# Patient Record
Sex: FEMALE | Race: WHITE | NOT HISPANIC OR LATINO | Employment: FULL TIME
[De-identification: names, ages, dates, MRNs, and addresses within clinical notes are randomized per-mention and may not be internally consistent; named-entity substitution may affect disease eponyms.]

---

## 2020-11-25 ENCOUNTER — TRANSCRIBE ORDERS (OUTPATIENT)
Dept: OTHER | Age: 65
End: 2020-11-25

## 2020-11-25 ENCOUNTER — PATIENT OUTREACH (OUTPATIENT)
Dept: ONCOLOGY | Facility: CLINIC | Age: 65
End: 2020-11-25

## 2020-11-25 DIAGNOSIS — C54.1 ENDOMETRIAL CANCER (H): Primary | ICD-10-CM

## 2020-11-25 NOTE — PROGRESS NOTES
New Patient Oncology Nurse Navigator Note     Referring provider: Evy Morton MD    Referring Clinic/Organization: [facility]      Referred to: Gyn/Onc    Requested provider (if applicable): First available - did not specify     Referral Received: 11/25/20     Evaluation for : new dx endometrial ca     Clinical History (per Nurse review of records provided):      Patient presented with PMB. Subsequent pelvic US and biopsy performed on 11/13. Pathology showed endometrioid adenocarcinoma.     Clinical Assessment / Barriers to Care (Per Nurse):    Attempted to call patient for outreach, no answer. Did not leave message due to unknown privacy with voicemail.       Records Location: [facility] Everywhere     Records Needed:     None    Additional testing needed prior to consult:     None    Referral updates and Plan:     Referred on for scheduling.      Polina Box, RN, BSN, OCN    [facility]  [phone]

## 2020-11-30 ENCOUNTER — PRE VISIT (OUTPATIENT)
Dept: ONCOLOGY | Facility: CLINIC | Age: 65
End: 2020-11-30

## 2020-11-30 NOTE — TELEPHONE ENCOUNTER
ONCOLOGY INTAKE: Records Information      APPT INFORMATION:  Referring provider: Dr. Morton  Referring provider s clinic:  [facility]   Reason for visit/diagnosis:  Endometrial cancer  Has patient been notified of appointment date and time?: yes    RECORDS INFORMATION:  Were the records received with the referral (via [employer])? no    Has patient been seen for any external appt for this diagnosis? yes    If yes, where? [facility]    Has patient had any imaging or procedures outside of [place]  [place] for this condition? yes      If Yes, where? [facility]     ADDITIONAL INFORMATION:  none

## 2020-12-01 NOTE — TELEPHONE ENCOUNTER
Action    Action Taken 12/1/20:    -Maddy Perry/KAIN Ortega, [facility] - Path: [id number]    -12/11/20: Imaging from [facility] resolved, and now viewable to PACS  7:21 AM       RECORDS STATUS - ALL OTHER DIAGNOSIS      RECORDS RECEIVED FROM: [facility]   DATE RECEIVED:    NOTES STATUS DETAILS   OFFICE NOTE from referring provider SCOTT - [facility] Morton: 11/13/20   OFFICE NOTE from medical oncologist     DISCHARGE SUMMARY from hospital     DISCHARGE REPORT from the ER     OPERATIVE REPORT     MEDICATION LIST     CLINICAL TRIAL TREATMENTS TO DATE     LABS     PATHOLOGY REPORTS [facility], Report in CE, Slides requested 12/1 11/13/20: [id number]   ANYTHING RELATED TO DIAGNOSIS Epic/CE 10/23/20: PAP   GENONOMIC TESTING     TYPE:     IMAGING (NEED IMAGES & REPORT)     CT SCANS     MRI     MAMMO     ULTRASOUND Requested 12/1 11/13/20: [facility], Report in CE   PET

## 2020-12-13 ASSESSMENT — ENCOUNTER SYMPTOMS
HOT FLASHES: 0
DECREASED LIBIDO: 0

## 2020-12-13 NOTE — PROGRESS NOTES
Consult Notes on Referred Patient    Date: 2020       Dr. Evy Morton MD  [facility]  [address] [place] [address]       RE: Dee Carlos  : 1955  IAN: 2020    Dear Dr. Evy Morton:    I had the pleasure of seeing your patient Dee Carlos here at the Gynecologic Cancer Clinic at the [facility] on 2020.  As you know she is a very pleasant 65 year old woman with a recent diagnosis of endometrial cancer.  Given these findings she was subsequently sent to the Gynecologic Cancer Clinic for new patient consultation.     HPI  She was recently evaluated by Dr. Morton for PMB.  This evaluation resulted in an US which demonstrated fibroids and a thickened EMS.  A pap and biopsy were obtained (HPV negative, JULIET cells).  Biopsy demonstarted grade 1 EMCA      Review of Systems:    Systemic           no weight changes; no fever; no chills; no night sweats; no appetite changes  Skin           no rashes, or lesions  Eye           no irritation; no changes in vision  Heriberto-Laryngeal           no dysphagia; no hoarseness   Pulmonary    no cough; no shortness of breath  Cardiovascular    no chest pain; no palpitations  Gastrointestinal    no diarrhea; no constipation; no abdominal pain; no changes in bowel  habits; no blood in stool  Genitourinary   no urinary frequency; no urinary urgency; no dysuria; no pain; no abnormal vaginal discharge; no abnormal vaginal bleeding  Breast   no breast discharge; no breast changes; no breast pain  Musculoskeletal    no myalgias; no arthralgias; no back pain  Psychiatric           no depressed mood; no anxiety    Hematologic           no tender lymph nodes; no noticeable swellings or lumps   Endocrine    no hot flashes; no heat/cold intolerance         Neurological   no tremor; no numbness and tingling; no headaches; no difficulty  sleeping      Past Medical History:    No past medical history on  "file.      Past Surgical History:    No past surgical history on file.      Health Maintenance:  Health Maintenance Due   Topic Date Due     PREVENTIVE CARE VISIT  1955     ADVANCE CARE PLANNING  1955     MAMMO SCREENING  1955     COLORECTAL CANCER SCREENING  06/06/1967     HIV SCREENING  06/06/1972     HEPATITIS C SCREENING  06/06/1975     DTAP/TDAP/TD IMMUNIZATION (1 - Tdap) 06/06/1982     LIPID  06/06/2002     ZOSTER IMMUNIZATION (2 of 3) 12/22/2017     PHQ-2  01/01/2020       Last Pap Smear: See above    Last Mammogram: birads 2; 2020     Last Colonoscopy: none      Current Medications:     currently has no medications in their medication list.       Allergies:      No Known Allergies       Social History:     Social History     Tobacco Use     Smoking status: Not on file   Substance Use Topics     Alcohol use: Not on file       History   Drug Use Not on file           Family History:     The patient's family history is notable for no pberast, colon, ovary, uterine CA.      Physical Exam:   PS 0  VS: BP (!) 143/73   Pulse 72   Temp 97.5  F (36.4  C) (Tympanic)   Resp 18   Ht 1.651 m (5' 5\")   Wt 78 kg (171 lb 14.4 oz)   SpO2 98%   BMI 28.61 kg/m       General Appearance: healthy and alert, no distress     HEENT:  no thyromegaly, no palpable nodules or masses        Cardiovascular: regular rate and rhythm, no gallops, rubs or murmurs     Respiratory: lungs clear, no rales, rhonchi or wheezes, normal diaphragmatic excursion    Musculoskeletal: extremities non tender and without edema    Skin: no lesions or rashes     Neurological: normal gait, no gross defects     Psychiatric: appropriate mood and affect                               Hematological: normal cervical, supraclavicular and inguinal lymph nodes     Gastrointestinal:       abdomen soft, non-tender, non-distended, no organomegaly or masses    Genitourinary: External genitalia and urethral meatus appears normal.  Vagina is smooth " without nodularity or masses.  Cervix appears normal and without lesions.  Bimanual exam reveals no masses, nodularity or fullness.  Recto-vaginal exam confirms these findings.      Assessment:    Dee Carlos is a 65 year old woman with a new diagnosis of EMCA.     A total of 60 minutes was spent with the patient, 40 minutes of which were spent in counseling the patient and/or treatment planning.      Plan:     1.)  EMCA  -  we have discussed the pathologic an clinical findings and she is aware of the potential for occult malignancy that is worse than her current diagnosis.  We have discussed options including medical and surgical management. After a comprehensive discussion of the relative risks and benefits of each strategy she is inclined to surgery, which is reasonable.  I have discussed the potential risks and benefits of ovarian removal at the time of hysterectomy and she is inclined to TLH/BSO, with staging as indicated by the intra-operative findings.     We have discussed enrollemnt in Dr. Ferreira's exemestane trial; unfortunately - while she is interested there is no research team here today.     2.) Genetic risk factors were assessed and the patient does not meet the qualifications for a referral.      3.) Labs and/or tests ordered include:  Pre-operative labs.     4.) Health maintenance issues addressed today include none.    5.) Pre-op teaching was completed today.  Risks of surgery were discussed to include: bleeding, transfusion, infection, unintentional injury to surrounding organs/structures.      Thank you for allowing us to participate in the care of your patient.         Sincerely,    John Proctor MD      CC  No care team member to display  BRANDY LUCAS    Answers for HPI/ROS submitted by the patient on 12/13/2020   General Symptoms: No  Skin Symptoms: No  HENT Symptoms: No  EYE SYMPTOMS: No  HEART SYMPTOMS: No  LUNG SYMPTOMS: No  INTESTINAL SYMPTOMS: No  URINARY SYMPTOMS:  No  GYNECOLOGIC SYMPTOMS: Yes  BREAST SYMPTOMS: No  SKELETAL SYMPTOMS: No  BLOOD SYMPTOMS: No  NERVOUS SYSTEM SYMPTOMS: No  MENTAL HEALTH SYMPTOMS: No  Bleeding or spotting between periods: No  Heavy or painful periods: No  Irregular periods: No  Vaginal discharge: No  Hot flashes: No  Vaginal dryness: No  Genital ulcers: No  Reduced libido: No  Painful intercourse: No  Difficulty with sexual arousal: No  Post-menopausal bleeding: Yes

## 2020-12-14 ENCOUNTER — ANCILLARY PROCEDURE (OUTPATIENT)
Dept: GENERAL RADIOLOGY | Facility: CLINIC | Age: 65
End: 2020-12-14
Attending: OBSTETRICS & GYNECOLOGY
Payer: COMMERCIAL

## 2020-12-14 ENCOUNTER — ONCOLOGY VISIT (OUTPATIENT)
Dept: ONCOLOGY | Facility: CLINIC | Age: 65
End: 2020-12-14
Attending: OBSTETRICS & GYNECOLOGY
Payer: COMMERCIAL

## 2020-12-14 VITALS
DIASTOLIC BLOOD PRESSURE: 73 MMHG | SYSTOLIC BLOOD PRESSURE: 143 MMHG | OXYGEN SATURATION: 98 % | HEART RATE: 72 BPM | HEIGHT: 65 IN | TEMPERATURE: 97.5 F | WEIGHT: 171.9 LBS | BODY MASS INDEX: 28.64 KG/M2 | RESPIRATION RATE: 18 BRPM

## 2020-12-14 DIAGNOSIS — C54.1 ENDOMETRIAL CANCER (H): ICD-10-CM

## 2020-12-14 LAB
ALBUMIN SERPL-MCNC: 4.2 G/DL (ref 3.4–5)
ALP SERPL-CCNC: 53 U/L (ref 40–150)
ALT SERPL W P-5'-P-CCNC: 37 U/L (ref 0–50)
ANION GAP SERPL CALCULATED.3IONS-SCNC: 8 MMOL/L (ref 3–14)
AST SERPL W P-5'-P-CCNC: 35 U/L (ref 0–45)
BASOPHILS # BLD AUTO: 0 10E9/L (ref 0–0.2)
BASOPHILS NFR BLD AUTO: 0.7 %
BILIRUB SERPL-MCNC: 0.5 MG/DL (ref 0.2–1.3)
BUN SERPL-MCNC: 20 MG/DL (ref 7–30)
CALCIUM SERPL-MCNC: 9.7 MG/DL (ref 8.5–10.1)
CHLORIDE SERPL-SCNC: 96 MMOL/L (ref 94–109)
CO2 SERPL-SCNC: 31 MMOL/L (ref 20–32)
CREAT SERPL-MCNC: 0.89 MG/DL (ref 0.52–1.04)
DIFFERENTIAL METHOD BLD: NORMAL
EOSINOPHIL # BLD AUTO: 0.3 10E9/L (ref 0–0.7)
EOSINOPHIL NFR BLD AUTO: 4.3 %
ERYTHROCYTE [DISTWIDTH] IN BLOOD BY AUTOMATED COUNT: 12.3 % (ref 10–15)
GFR SERPL CREATININE-BSD FRML MDRD: 69 ML/MIN/{1.73_M2}
GLUCOSE SERPL-MCNC: 108 MG/DL (ref 70–99)
HCT VFR BLD AUTO: 42.1 % (ref 35–47)
HGB BLD-MCNC: 13.9 G/DL (ref 11.7–15.7)
IMM GRANULOCYTES # BLD: 0 10E9/L (ref 0–0.4)
IMM GRANULOCYTES NFR BLD: 0.2 %
LYMPHOCYTES # BLD AUTO: 1.9 10E9/L (ref 0.8–5.3)
LYMPHOCYTES NFR BLD AUTO: 32.4 %
MCH RBC QN AUTO: 32.1 PG (ref 26.5–33)
MCHC RBC AUTO-ENTMCNC: 33 G/DL (ref 31.5–36.5)
MCV RBC AUTO: 97 FL (ref 78–100)
MONOCYTES # BLD AUTO: 0.6 10E9/L (ref 0–1.3)
MONOCYTES NFR BLD AUTO: 10.4 %
NEUTROPHILS # BLD AUTO: 3.1 10E9/L (ref 1.6–8.3)
NEUTROPHILS NFR BLD AUTO: 52 %
NRBC # BLD AUTO: 0 10*3/UL
NRBC BLD AUTO-RTO: 0 /100
PLATELET # BLD AUTO: 258 10E9/L (ref 150–450)
POTASSIUM SERPL-SCNC: 3.8 MMOL/L (ref 3.4–5.3)
PROT SERPL-MCNC: 8.5 G/DL (ref 6.8–8.8)
RBC # BLD AUTO: 4.33 10E12/L (ref 3.8–5.2)
SODIUM SERPL-SCNC: 134 MMOL/L (ref 133–144)
WBC # BLD AUTO: 5.9 10E9/L (ref 4–11)

## 2020-12-14 PROCEDURE — 80053 COMPREHEN METABOLIC PANEL: CPT | Performed by: PATHOLOGY

## 2020-12-14 PROCEDURE — 93010 ELECTROCARDIOGRAM REPORT: CPT | Performed by: INTERNAL MEDICINE

## 2020-12-14 PROCEDURE — 36415 COLL VENOUS BLD VENIPUNCTURE: CPT | Performed by: PATHOLOGY

## 2020-12-14 PROCEDURE — 99205 OFFICE O/P NEW HI 60 MIN: CPT | Performed by: OBSTETRICS & GYNECOLOGY

## 2020-12-14 PROCEDURE — 85025 COMPLETE CBC W/AUTO DIFF WBC: CPT | Performed by: PATHOLOGY

## 2020-12-14 PROCEDURE — 93005 ELECTROCARDIOGRAM TRACING: CPT

## 2020-12-14 PROCEDURE — G0463 HOSPITAL OUTPT CLINIC VISIT: HCPCS | Mod: 25

## 2020-12-14 PROCEDURE — 71046 X-RAY EXAM CHEST 2 VIEWS: CPT | Mod: GC | Performed by: RADIOLOGY

## 2020-12-14 RX ORDER — ATORVASTATIN CALCIUM 40 MG/1
40 TABLET, FILM COATED ORAL DAILY
COMMUNITY
Start: 2020-11-18 | End: 2021-11-23

## 2020-12-14 RX ORDER — CEFAZOLIN SODIUM 2 G/50ML
2 SOLUTION INTRAVENOUS
Status: CANCELLED | OUTPATIENT
Start: 2020-12-14

## 2020-12-14 RX ORDER — AMOXICILLIN 500 MG/1
500 CAPSULE ORAL DAILY
COMMUNITY
Start: 2019-12-16 | End: 2021-01-08

## 2020-12-14 RX ORDER — CEFAZOLIN SODIUM 1 G/50ML
1 INJECTION, SOLUTION INTRAVENOUS SEE ADMIN INSTRUCTIONS
Status: CANCELLED | OUTPATIENT
Start: 2020-12-14

## 2020-12-14 RX ORDER — AMLODIPINE BESYLATE 10 MG/1
10 TABLET ORAL DAILY
COMMUNITY
Start: 2020-11-18

## 2020-12-14 RX ORDER — PHENAZOPYRIDINE HYDROCHLORIDE 200 MG/1
200 TABLET, FILM COATED ORAL ONCE
Status: CANCELLED | OUTPATIENT
Start: 2020-12-14 | End: 2020-12-14

## 2020-12-14 RX ORDER — LISINOPRIL AND HYDROCHLOROTHIAZIDE 20; 25 MG/1; MG/1
1 TABLET ORAL DAILY
COMMUNITY
Start: 2020-11-18

## 2020-12-14 SDOH — HEALTH STABILITY: MENTAL HEALTH: HOW MANY STANDARD DRINKS CONTAINING ALCOHOL DO YOU HAVE ON A TYPICAL DAY?: 1 OR 2

## 2020-12-14 SDOH — HEALTH STABILITY: MENTAL HEALTH: HOW OFTEN DO YOU HAVE 6 OR MORE DRINKS ON ONE OCCASION?: NOT ASKED

## 2020-12-14 SDOH — HEALTH STABILITY: MENTAL HEALTH: HOW OFTEN DO YOU HAVE A DRINK CONTAINING ALCOHOL?: 4 OR MORE TIMES A WEEK

## 2020-12-14 ASSESSMENT — MIFFLIN-ST. JEOR: SCORE: 1335.61

## 2020-12-14 ASSESSMENT — PAIN SCALES - GENERAL: PAINLEVEL: NO PAIN (0)

## 2020-12-14 NOTE — NURSING NOTE
"Oncology Rooming Note    December 14, 2020 12:11 PM   Dee Carlos is a 65 year old female who presents for:    Chief Complaint   Patient presents with     Consult     Endometrial Cancer     Initial Vitals: BP (!) 143/73   Pulse 72   Temp 97.5  F (36.4  C) (Tympanic)   Resp 18   Ht 1.651 m (5' 5\")   Wt 78 kg (171 lb 14.4 oz)   SpO2 98%   BMI 28.61 kg/m   Estimated body mass index is 28.61 kg/m  as calculated from the following:    Height as of this encounter: 1.651 m (5' 5\").    Weight as of this encounter: 78 kg (171 lb 14.4 oz). Body surface area is 1.89 meters squared.  No Pain (0) Comment: Data Unavailable   No LMP recorded.  Allergies reviewed: Yes  Medications reviewed: Yes    Medications: Medication refills not needed today.  Pharmacy name entered into [employer]: [facility], [place] - [id number]    Clinical concerns: an Ma CMA              "

## 2020-12-14 NOTE — PATIENT INSTRUCTIONS
Surgery to be scheduled  Post operative visit will be set up about 2 weeks after your surgery, you will be contacted with at date and time

## 2020-12-14 NOTE — LETTER
2020         RE: Dee Carlos  [address]  [place] [address]        Dear Colleague,    Thank you for referring your patient, Dee Carlos, to the [facility]. Please see a copy of my visit note below.                            Consult Notes on Referred Patient    Date: 2020       Dr. Evy Morton MD  [facility]  [address] [place] [address]       RE: Dee Carlos  : 1955  IAN: 2020    Dear Dr. Evy Morton:    I had the pleasure of seeing your patient Dee Carlos here at the Gynecologic Cancer Clinic at the [facility] on 2020.  As you know she is a very pleasant 65 year old woman with a recent diagnosis of endometrial cancer.  Given these findings she was subsequently sent to the Gynecologic Cancer Clinic for new patient consultation.     HPI  She was recently evaluated by Dr. Morton for PMB.  This evaluation resulted in an US which demonstrated fibroids and a thickened EMS.  A pap and biopsy were obtained (HPV negative, JULIET cells).  Biopsy demonstarted grade 1 EMCA      Review of Systems:    Systemic           no weight changes; no fever; no chills; no night sweats; no appetite changes  Skin           no rashes, or lesions  Eye           no irritation; no changes in vision  Heriberto-Laryngeal           no dysphagia; no hoarseness   Pulmonary    no cough; no shortness of breath  Cardiovascular    no chest pain; no palpitations  Gastrointestinal    no diarrhea; no constipation; no abdominal pain; no changes in bowel  habits; no blood in stool  Genitourinary   no urinary frequency; no urinary urgency; no dysuria; no pain; no abnormal vaginal discharge; no abnormal vaginal bleeding  Breast   no breast discharge; no breast changes; no breast pain  Musculoskeletal    no myalgias; no arthralgias; no back pain  Psychiatric           no depressed mood; no anxiety    Hematologic           no tender  "lymph nodes; no noticeable swellings or lumps   Endocrine    no hot flashes; no heat/cold intolerance         Neurological   no tremor; no numbness and tingling; no headaches; no difficulty  sleeping      Past Medical History:    No past medical history on file.      Past Surgical History:    No past surgical history on file.      Health Maintenance:  Health Maintenance Due   Topic Date Due     PREVENTIVE CARE VISIT  1955     ADVANCE CARE PLANNING  1955     MAMMO SCREENING  1955     COLORECTAL CANCER SCREENING  06/06/1967     HIV SCREENING  06/06/1972     HEPATITIS C SCREENING  06/06/1975     DTAP/TDAP/TD IMMUNIZATION (1 - Tdap) 06/06/1982     LIPID  06/06/2002     ZOSTER IMMUNIZATION (2 of 3) 12/22/2017     PHQ-2  01/01/2020       Last Pap Smear: See above    Last Mammogram: birads 2; 2020     Last Colonoscopy: none      Current Medications:     currently has no medications in their medication list.       Allergies:      No Known Allergies       Social History:     Social History     Tobacco Use     Smoking status: Not on file   Substance Use Topics     Alcohol use: Not on file       History   Drug Use Not on file           Family History:     The patient's family history is notable for no pberast, colon, ovary, uterine CA.      Physical Exam:   PS 0  VS: BP (!) 143/73   Pulse 72   Temp 97.5  F (36.4  C) (Tympanic)   Resp 18   Ht 1.651 m (5' 5\")   Wt 78 kg (171 lb 14.4 oz)   SpO2 98%   BMI 28.61 kg/m       General Appearance: healthy and alert, no distress     HEENT:  no thyromegaly, no palpable nodules or masses        Cardiovascular: regular rate and rhythm, no gallops, rubs or murmurs     Respiratory: lungs clear, no rales, rhonchi or wheezes, normal diaphragmatic excursion    Musculoskeletal: extremities non tender and without edema    Skin: no lesions or rashes     Neurological: normal gait, no gross defects     Psychiatric: appropriate mood and affect                             "   Hematological: normal cervical, supraclavicular and inguinal lymph nodes     Gastrointestinal:       abdomen soft, non-tender, non-distended, no organomegaly or masses    Genitourinary: External genitalia and urethral meatus appears normal.  Vagina is smooth without nodularity or masses.  Cervix appears normal and without lesions.  Bimanual exam reveals no masses, nodularity or fullness.  Recto-vaginal exam confirms these findings.      Assessment:    Dee Carlos is a 65 year old woman with a new diagnosis of EMCA.     A total of 60 minutes was spent with the patient, 40 minutes of which were spent in counseling the patient and/or treatment planning.      Plan:     1.)  EMCA  -  we have discussed the pathologic an clinical findings and she is aware of the potential for occult malignancy that is worse than her current diagnosis.  We have discussed options including medical and surgical management. After a comprehensive discussion of the relative risks and benefits of each strategy she is inclined to surgery, which is reasonable.  I have discussed the potential risks and benefits of ovarian removal at the time of hysterectomy and she is inclined to TLH/BSO, with staging as indicated by the intra-operative findings.     We have discussed enrollemnt in Dr. Ferreira's exemestane trial; unfortunately - while she is interested there is no research team here today.     2.) Genetic risk factors were assessed and the patient does not meet the qualifications for a referral.      3.) Labs and/or tests ordered include:  Pre-operative labs.     4.) Health maintenance issues addressed today include none.    5.) Pre-op teaching was completed today.  Risks of surgery were discussed to include: bleeding, transfusion, infection, unintentional injury to surrounding organs/structures.      Thank you for allowing us to participate in the care of your patient.         Sincerely,    John Proctor MD      CC  No care team  member to display  BRANDY LUCAS    Answers for HPI/ROS submitted by the patient on 12/13/2020   General Symptoms: No  Skin Symptoms: No  HENT Symptoms: No  EYE SYMPTOMS: No  HEART SYMPTOMS: No  LUNG SYMPTOMS: No  INTESTINAL SYMPTOMS: No  URINARY SYMPTOMS: No  GYNECOLOGIC SYMPTOMS: Yes  BREAST SYMPTOMS: No  SKELETAL SYMPTOMS: No  BLOOD SYMPTOMS: No  NERVOUS SYSTEM SYMPTOMS: No  MENTAL HEALTH SYMPTOMS: No  Bleeding or spotting between periods: No  Heavy or painful periods: No  Irregular periods: No  Vaginal discharge: No  Hot flashes: No  Vaginal dryness: No  Genital ulcers: No  Reduced libido: No  Painful intercourse: No  Difficulty with sexual arousal: No  Post-menopausal bleeding: Yes        Again, thank you for allowing me to participate in the care of your patient.        Sincerely,        John Proctor MD

## 2020-12-14 NOTE — NURSING NOTE
Pre Op Nurse Teaching Template    Relevant Diagnosis: endometrial cancer    Teaching Topic: laparoscopic removal of uterus bilateral fallopian tubes and ovaries, cervix, possible open abdomen cancer operation     Person(s) involved in teaching :  Patient    Motivation Level:  Asks Questions:    Yes      Eager to Learn:     Yes     Cooperative:          Yes    Receptive (willing. Able to accept information):    Yes      Patient and those who are listed above demonstrates understanding of the following:   Reason for the appointment, diagnosis and treatment plan:   Yes   Knowledge of proper use of medications and conditions for which they are ordered (with special attention to potential side effects or drug interactions): Yes   Which situations necessitate calling provider and whom to contact: Yes         Nutritional needs and diet plan:  Yes      Pain management techniques:     Yes, Pain Scale   Diet:   Yes, [facility] Diet Instructions    Teaching Concerns addressed: Yes    Infection Prevention:  Patient and those who are listed above demonstrate understanding of the following:  Pre-Op CHG Bathing Instructions: Yes  Surgical procedure site care taught:   Yes   Signs and symptoms of infection taught: Yes       Instructional Materials Used/Given:  The [place] Before You Surgery Booklet  Showering or Bathing before Surgery Instructions & CHG Product  Hysterectomy Guidelines  Pain Assessment Tool   Home Care after Major Abdominal or Vaginal Surgery  Map  Accommodations Brochure  Phone numbers for [facility] and [facility]  Copy of Surgical Consent    Done Today:  Lab work, EKG, Chest Xray,   Preop Visit not needed   Tests Ordered:  Post Op Visit Scheduled:tbd  Comments:    Surgery date/time:tbd  covid test to be done 3-4 days before surgery date    Consent to file in medical records  .

## 2020-12-14 NOTE — NURSING NOTE
EKG was performed on patient. Patient tolerated EKG well without any incidents. EKG was transmitted to [facility] and given to Susi.       Edna Coker MA on 12/14/2020 at 2:29 PM

## 2020-12-15 ENCOUNTER — DOCUMENTATION ONLY (OUTPATIENT)
Dept: ONCOLOGY | Facility: CLINIC | Age: 65
End: 2020-12-15

## 2020-12-15 LAB — INTERPRETATION ECG - MUSE: NORMAL

## 2020-12-15 NOTE — PROGRESS NOTES
Surgery is scheduled with Dr. Proctor on 1/12 at [place] [facility].  Scheduled per RN.    H&P: to be completed by PCP or Dr. Proctor  COVID: 1/8, 1 PM at [facility]   Post-op: will be scheduled by the clinic.     The RN completed the education regarding the surgery.     Patient will receive a phone call from pre-admission nurses 1-2 days prior to surgery with arrival and start time.    The surgery packet was provided by the RN during appointment.    Per communication - I did not need to reach out to the patient regarding the above information. If this changes, I will reach out.

## 2020-12-16 PROCEDURE — 88360 TUMOR IMMUNOHISTOCHEM/MANUAL: CPT | Mod: 26 | Performed by: PATHOLOGY

## 2020-12-16 PROCEDURE — 88323 CONSLTJ&REPRT MATRL PREP SLD: CPT | Mod: 26 | Performed by: PATHOLOGY

## 2020-12-16 PROCEDURE — 88342 IMHCHEM/IMCYTCHM 1ST ANTB: CPT | Mod: 26 | Performed by: PATHOLOGY

## 2020-12-16 PROCEDURE — 88360 TUMOR IMMUNOHISTOCHEM/MANUAL: CPT | Mod: TC | Performed by: OBSTETRICS & GYNECOLOGY

## 2020-12-16 PROCEDURE — 88342 IMHCHEM/IMCYTCHM 1ST ANTB: CPT | Mod: TC | Performed by: OBSTETRICS & GYNECOLOGY

## 2020-12-16 PROCEDURE — 88323 CONSLTJ&REPRT MATRL PREP SLD: CPT | Mod: TC | Performed by: OBSTETRICS & GYNECOLOGY

## 2020-12-28 LAB — COPATH REPORT: NORMAL

## 2021-01-09 ENCOUNTER — HEALTH MAINTENANCE LETTER (OUTPATIENT)
Age: 66
End: 2021-01-09

## 2021-01-09 DIAGNOSIS — Z20.822 COVID-19 RULED OUT: Primary | ICD-10-CM

## 2021-01-09 LAB
SARS-COV-2 RNA RESP QL NAA+PROBE: NORMAL
SPECIMEN SOURCE: NORMAL

## 2021-01-09 PROCEDURE — U0003 INFECTIOUS AGENT DETECTION BY NUCLEIC ACID (DNA OR RNA); SEVERE ACUTE RESPIRATORY SYNDROME CORONAVIRUS 2 (SARS-COV-2) (CORONAVIRUS DISEASE [COVID-19]), AMPLIFIED PROBE TECHNIQUE, MAKING USE OF HIGH THROUGHPUT TECHNOLOGIES AS DESCRIBED BY CMS-2020-01-R: HCPCS | Performed by: OBSTETRICS & GYNECOLOGY

## 2021-01-09 PROCEDURE — U0005 INFEC AGEN DETEC AMPLI PROBE: HCPCS | Performed by: OBSTETRICS & GYNECOLOGY

## 2021-01-10 LAB
LABORATORY COMMENT REPORT: NORMAL
SARS-COV-2 RNA RESP QL NAA+PROBE: NEGATIVE
SPECIMEN SOURCE: NORMAL

## 2021-01-11 ENCOUNTER — ANESTHESIA EVENT (OUTPATIENT)
Dept: SURGERY | Facility: CLINIC | Age: 66
End: 2021-01-11
Payer: COMMERCIAL

## 2021-01-11 NOTE — ANESTHESIA PREPROCEDURE EVALUATION
"Anesthesia Pre-Procedure Evaluation    Patient: Dee Carlos   MRN:     [id number] Gender:   female   Age:    65 year old :      1955        Preoperative Diagnosis: Endometrial cancer (H) [C54.1]   Procedure(s):  HYSTERECTOMY, TOTAL, LAPAROSCOPIC, WITH SALPINGO-OOPHORECTOMY AND LYMPHADENECTOMY  HYSTERECTOMY, TOTAL ABDOMINAL, WITH BILATERAL SALPINGO-OOPHORECTOMY, WITH LYMPHADENECTOMY  CYSTOSCOPY  Latex Free     LABS:  CBC:   Lab Results   Component Value Date    WBC 5.9 2020    HGB 13.9 2020    HCT 42.1 2020     2020     BMP:   Lab Results   Component Value Date     2020    POTASSIUM 3.8 2020    CHLORIDE 96 2020    CO2 31 2020    BUN 20 2020    CR 0.89 2020     (H) 2020     COAGS: No results found for: PTT, INR, FIBR  POC: No results found for: BGM, HCG, HCGS  OTHER:   Lab Results   Component Value Date    LEI 9.7 2020    ALBUMIN 4.2 2020    PROTTOTAL 8.5 2020    ALT 37 2020    AST 35 2020    ALKPHOS 53 2020    BILITOTAL 0.5 2020        Preop Vitals    BP Readings from Last 3 Encounters:   20 (!) 143/73    Pulse Readings from Last 3 Encounters:   20 72      Resp Readings from Last 3 Encounters:   20 18    SpO2 Readings from Last 3 Encounters:   20 98%      Temp Readings from Last 1 Encounters:   20 36.4  C (97.5  F) (Tympanic)    Ht Readings from Last 1 Encounters:   20 1.651 m (5' 5\")      Wt Readings from Last 1 Encounters:   20 78 kg (171 lb 14.4 oz)    Estimated body mass index is 28.61 kg/m  as calculated from the following:    Height as of 20: 1.651 m (5' 5\").    Weight as of 20: 78 kg (171 lb 14.4 oz).     LDA:        History reviewed. No pertinent past medical history.   History reviewed. No pertinent surgical history.   No Known Allergies     Anesthesia Evaluation     .             ROS/MED HX    ENT/Pulmonary:  - neg " pulmonary ROS     Neurologic:  - neg neurologic ROS     Cardiovascular:     (+) Dyslipidemia, hypertension----. : . . . :. .       METS/Exercise Tolerance:     Hematologic:         Musculoskeletal:   (+) arthritis,  -       GI/Hepatic:         Renal/Genitourinary:         Endo:         Psychiatric:         Infectious Disease:         Malignancy:   (+) Malignancy   Endometrial cancer        Other:                     JZG FV AN PHYSICAL EXAM    Assessment:   ASA SCORE: 2    H&P: History and physical reviewed and following examination; no interval change.   Smoking Status:  Non-Smoker/Unknown        Plan:   Anes. Type:  General   Pre-Medication: None   Induction:  IV (Standard)   Airway: ETT; Oral   Access/Monitoring: PIV; 2nd PIV   Maintenance: Balanced     Postop Plan:   Postop Pain: Opioids; Regional  Postop Sedation/Airway: Not planned     PONV Management:   Adult Risk Factors: Female, Non-Smoker, Postop Opioids   Prevention: Ondansetron, Dexamethasone     CONSENT: Direct conversation   Plan and risks discussed with: Patient   Blood Products: Consented (ALL Blood Products)                   Dario Henley MD

## 2021-01-12 ENCOUNTER — HOSPITAL ENCOUNTER (OUTPATIENT)
Facility: CLINIC | Age: 66
Discharge: HOME | End: 2021-01-12
Attending: OBSTETRICS & GYNECOLOGY | Admitting: OBSTETRICS & GYNECOLOGY
Payer: COMMERCIAL

## 2021-01-12 ENCOUNTER — ANESTHESIA (OUTPATIENT)
Dept: SURGERY | Facility: CLINIC | Age: 66
End: 2021-01-12
Payer: COMMERCIAL

## 2021-01-12 VITALS
DIASTOLIC BLOOD PRESSURE: 60 MMHG | HEIGHT: 63 IN | WEIGHT: 168.87 LBS | HEART RATE: 57 BPM | RESPIRATION RATE: 20 BRPM | OXYGEN SATURATION: 94 % | TEMPERATURE: 96.9 F | BODY MASS INDEX: 29.92 KG/M2 | SYSTOLIC BLOOD PRESSURE: 109 MMHG

## 2021-01-12 DIAGNOSIS — C54.1 ENDOMETRIAL CANCER (H): ICD-10-CM

## 2021-01-12 LAB
ABO + RH BLD: NORMAL
ABO + RH BLD: NORMAL
BLD GP AB SCN SERPL QL: NORMAL
BLOOD BANK CMNT PATIENT-IMP: NORMAL
GLUCOSE BLDC GLUCOMTR-MCNC: 100 MG/DL (ref 70–99)
SPECIMEN EXP DATE BLD: NORMAL

## 2021-01-12 PROCEDURE — 250N000025 HC SEVOFLURANE, PER MIN: Performed by: OBSTETRICS & GYNECOLOGY

## 2021-01-12 PROCEDURE — 86901 BLOOD TYPING SEROLOGIC RH(D): CPT | Performed by: ANESTHESIOLOGY

## 2021-01-12 PROCEDURE — 88112 CYTOPATH CELL ENHANCE TECH: CPT | Mod: 26 | Performed by: PATHOLOGY

## 2021-01-12 PROCEDURE — 250N000011 HC RX IP 250 OP 636: Performed by: STUDENT IN AN ORGANIZED HEALTH CARE EDUCATION/TRAINING PROGRAM

## 2021-01-12 PROCEDURE — 272N000001 HC OR GENERAL SUPPLY STERILE: Performed by: OBSTETRICS & GYNECOLOGY

## 2021-01-12 PROCEDURE — 999N001020 HC STATISTIC H-SEND OUTS PREP: Performed by: OBSTETRICS & GYNECOLOGY

## 2021-01-12 PROCEDURE — 88305 TISSUE EXAM BY PATHOLOGIST: CPT | Mod: TC | Performed by: OBSTETRICS & GYNECOLOGY

## 2021-01-12 PROCEDURE — 250N000013 HC RX MED GY IP 250 OP 250 PS 637: Performed by: STUDENT IN AN ORGANIZED HEALTH CARE EDUCATION/TRAINING PROGRAM

## 2021-01-12 PROCEDURE — 88112 CYTOPATH CELL ENHANCE TECH: CPT | Mod: TC | Performed by: OBSTETRICS & GYNECOLOGY

## 2021-01-12 PROCEDURE — 86900 BLOOD TYPING SEROLOGIC ABO: CPT | Performed by: ANESTHESIOLOGY

## 2021-01-12 PROCEDURE — 370N000017 HC ANESTHESIA TECHNICAL FEE, PER MIN: Performed by: OBSTETRICS & GYNECOLOGY

## 2021-01-12 PROCEDURE — 88342 IMHCHEM/IMCYTCHM 1ST ANTB: CPT | Mod: 26 | Performed by: PATHOLOGY

## 2021-01-12 PROCEDURE — 258N000003 HC RX IP 258 OP 636: Performed by: STUDENT IN AN ORGANIZED HEALTH CARE EDUCATION/TRAINING PROGRAM

## 2021-01-12 PROCEDURE — 88307 TISSUE EXAM BY PATHOLOGIST: CPT | Mod: TC | Performed by: OBSTETRICS & GYNECOLOGY

## 2021-01-12 PROCEDURE — 710N000010 HC RECOVERY PHASE 1, LEVEL 2, PER MIN: Performed by: OBSTETRICS & GYNECOLOGY

## 2021-01-12 PROCEDURE — 88360 TUMOR IMMUNOHISTOCHEM/MANUAL: CPT | Mod: TC | Performed by: OBSTETRICS & GYNECOLOGY

## 2021-01-12 PROCEDURE — 36415 COLL VENOUS BLD VENIPUNCTURE: CPT | Performed by: ANESTHESIOLOGY

## 2021-01-12 PROCEDURE — 88331 PATH CONSLTJ SURG 1 BLK 1SPC: CPT | Mod: 26 | Performed by: PATHOLOGY

## 2021-01-12 PROCEDURE — 250N000009 HC RX 250: Performed by: STUDENT IN AN ORGANIZED HEALTH CARE EDUCATION/TRAINING PROGRAM

## 2021-01-12 PROCEDURE — 88342 IMHCHEM/IMCYTCHM 1ST ANTB: CPT | Mod: TC | Performed by: OBSTETRICS & GYNECOLOGY

## 2021-01-12 PROCEDURE — 88305 TISSUE EXAM BY PATHOLOGIST: CPT | Mod: 26 | Performed by: PATHOLOGY

## 2021-01-12 PROCEDURE — 250N000011 HC RX IP 250 OP 636: Performed by: OBSTETRICS & GYNECOLOGY

## 2021-01-12 PROCEDURE — 88309 TISSUE EXAM BY PATHOLOGIST: CPT | Mod: 26 | Performed by: PATHOLOGY

## 2021-01-12 PROCEDURE — 88307 TISSUE EXAM BY PATHOLOGIST: CPT | Mod: 26 | Performed by: PATHOLOGY

## 2021-01-12 PROCEDURE — 88341 IMHCHEM/IMCYTCHM EA ADD ANTB: CPT | Mod: 26 | Performed by: PATHOLOGY

## 2021-01-12 PROCEDURE — 88377 M/PHMTRC ALYS ISHQUANT/SEMIQ: CPT | Mod: TC | Performed by: PATHOLOGY

## 2021-01-12 PROCEDURE — 999N001019 HC STATISTIC H-FISH PROCESS B/S: Performed by: OBSTETRICS & GYNECOLOGY

## 2021-01-12 PROCEDURE — 88309 TISSUE EXAM BY PATHOLOGIST: CPT | Mod: TC | Performed by: OBSTETRICS & GYNECOLOGY

## 2021-01-12 PROCEDURE — 999N001017 HC STATISTIC GLUCOSE BY METER IP

## 2021-01-12 PROCEDURE — 88341 IMHCHEM/IMCYTCHM EA ADD ANTB: CPT | Mod: TC | Performed by: OBSTETRICS & GYNECOLOGY

## 2021-01-12 PROCEDURE — 250N000013 HC RX MED GY IP 250 OP 250 PS 637: Performed by: OBSTETRICS & GYNECOLOGY

## 2021-01-12 PROCEDURE — 88331 PATH CONSLTJ SURG 1 BLK 1SPC: CPT | Mod: TC | Performed by: OBSTETRICS & GYNECOLOGY

## 2021-01-12 PROCEDURE — 999N001018 HC STATISTIC H-CELL BLOCK W/STAIN: Performed by: OBSTETRICS & GYNECOLOGY

## 2021-01-12 PROCEDURE — 360N000077 HC SURGERY LEVEL 4, PER MIN: Performed by: OBSTETRICS & GYNECOLOGY

## 2021-01-12 PROCEDURE — 86850 RBC ANTIBODY SCREEN: CPT | Performed by: ANESTHESIOLOGY

## 2021-01-12 PROCEDURE — 88360 TUMOR IMMUNOHISTOCHEM/MANUAL: CPT | Mod: 26 | Performed by: PATHOLOGY

## 2021-01-12 PROCEDURE — 999N000141 HC STATISTIC PRE-PROCEDURE NURSING ASSESSMENT: Performed by: OBSTETRICS & GYNECOLOGY

## 2021-01-12 PROCEDURE — 710N000012 HC RECOVERY PHASE 2, PER MINUTE: Performed by: OBSTETRICS & GYNECOLOGY

## 2021-01-12 PROCEDURE — 88377 M/PHMTRC ALYS ISHQUANT/SEMIQ: CPT | Mod: 26 | Performed by: MEDICAL GENETICS

## 2021-01-12 RX ORDER — ACETAMINOPHEN 325 MG/1
650 TABLET ORAL
Status: DISCONTINUED | OUTPATIENT
Start: 2021-01-12 | End: 2021-01-12 | Stop reason: HOSPADM

## 2021-01-12 RX ORDER — FENTANYL CITRATE 50 UG/ML
25-50 INJECTION, SOLUTION INTRAMUSCULAR; INTRAVENOUS
Status: DISCONTINUED | OUTPATIENT
Start: 2021-01-12 | End: 2021-01-12 | Stop reason: HOSPADM

## 2021-01-12 RX ORDER — NALOXONE HYDROCHLORIDE 0.4 MG/ML
0.4 INJECTION, SOLUTION INTRAMUSCULAR; INTRAVENOUS; SUBCUTANEOUS
Status: DISCONTINUED | OUTPATIENT
Start: 2021-01-12 | End: 2021-01-12 | Stop reason: HOSPADM

## 2021-01-12 RX ORDER — CEFAZOLIN SODIUM 2 G/100ML
2 INJECTION, SOLUTION INTRAVENOUS
Status: COMPLETED | OUTPATIENT
Start: 2021-01-12 | End: 2021-01-12

## 2021-01-12 RX ORDER — DEXAMETHASONE SODIUM PHOSPHATE 10 MG/ML
INJECTION, SOLUTION INTRAMUSCULAR; INTRAVENOUS PRN
Status: DISCONTINUED | OUTPATIENT
Start: 2021-01-12 | End: 2021-01-12

## 2021-01-12 RX ORDER — BUPIVACAINE HYDROCHLORIDE 2.5 MG/ML
INJECTION, SOLUTION EPIDURAL; INFILTRATION; INTRACAUDAL PRN
Status: DISCONTINUED | OUTPATIENT
Start: 2021-01-12 | End: 2021-01-12 | Stop reason: HOSPADM

## 2021-01-12 RX ORDER — FENTANYL CITRATE 50 UG/ML
INJECTION, SOLUTION INTRAMUSCULAR; INTRAVENOUS PRN
Status: DISCONTINUED | OUTPATIENT
Start: 2021-01-12 | End: 2021-01-12

## 2021-01-12 RX ORDER — SODIUM CHLORIDE, SODIUM LACTATE, POTASSIUM CHLORIDE, CALCIUM CHLORIDE 600; 310; 30; 20 MG/100ML; MG/100ML; MG/100ML; MG/100ML
INJECTION, SOLUTION INTRAVENOUS CONTINUOUS PRN
Status: DISCONTINUED | OUTPATIENT
Start: 2021-01-12 | End: 2021-01-12

## 2021-01-12 RX ORDER — GLYCOPYRROLATE 0.2 MG/ML
INJECTION, SOLUTION INTRAMUSCULAR; INTRAVENOUS PRN
Status: DISCONTINUED | OUTPATIENT
Start: 2021-01-12 | End: 2021-01-12

## 2021-01-12 RX ORDER — PROPOFOL 10 MG/ML
INJECTION, EMULSION INTRAVENOUS PRN
Status: DISCONTINUED | OUTPATIENT
Start: 2021-01-12 | End: 2021-01-12

## 2021-01-12 RX ORDER — NALOXONE HYDROCHLORIDE 0.4 MG/ML
0.2 INJECTION, SOLUTION INTRAMUSCULAR; INTRAVENOUS; SUBCUTANEOUS
Status: DISCONTINUED | OUTPATIENT
Start: 2021-01-12 | End: 2021-01-12 | Stop reason: HOSPADM

## 2021-01-12 RX ORDER — ONDANSETRON 2 MG/ML
INJECTION INTRAMUSCULAR; INTRAVENOUS PRN
Status: DISCONTINUED | OUTPATIENT
Start: 2021-01-12 | End: 2021-01-12

## 2021-01-12 RX ORDER — SODIUM CHLORIDE, SODIUM LACTATE, POTASSIUM CHLORIDE, CALCIUM CHLORIDE 600; 310; 30; 20 MG/100ML; MG/100ML; MG/100ML; MG/100ML
INJECTION, SOLUTION INTRAVENOUS CONTINUOUS
Status: DISCONTINUED | OUTPATIENT
Start: 2021-01-12 | End: 2021-01-12 | Stop reason: HOSPADM

## 2021-01-12 RX ORDER — PHENAZOPYRIDINE HYDROCHLORIDE 200 MG/1
200 TABLET, FILM COATED ORAL ONCE
Status: COMPLETED | OUTPATIENT
Start: 2021-01-12 | End: 2021-01-12

## 2021-01-12 RX ORDER — OXYCODONE HYDROCHLORIDE 5 MG/1
5 TABLET ORAL
Status: DISCONTINUED | OUTPATIENT
Start: 2021-01-12 | End: 2021-01-12 | Stop reason: HOSPADM

## 2021-01-12 RX ORDER — EPHEDRINE SULFATE 50 MG/ML
INJECTION, SOLUTION INTRAMUSCULAR; INTRAVENOUS; SUBCUTANEOUS PRN
Status: DISCONTINUED | OUTPATIENT
Start: 2021-01-12 | End: 2021-01-12

## 2021-01-12 RX ORDER — LIDOCAINE 40 MG/G
CREAM TOPICAL
Status: DISCONTINUED | OUTPATIENT
Start: 2021-01-12 | End: 2021-01-12 | Stop reason: HOSPADM

## 2021-01-12 RX ORDER — LIDOCAINE HYDROCHLORIDE 20 MG/ML
INJECTION, SOLUTION INFILTRATION; PERINEURAL PRN
Status: DISCONTINUED | OUTPATIENT
Start: 2021-01-12 | End: 2021-01-12

## 2021-01-12 RX ORDER — OXYCODONE HYDROCHLORIDE 5 MG/1
5-10 TABLET ORAL EVERY 4 HOURS PRN
Qty: 6 TABLET | Refills: 0 | Status: SHIPPED | OUTPATIENT
Start: 2021-01-12 | End: 2021-12-13

## 2021-01-12 RX ORDER — CEFAZOLIN SODIUM 1 G/3ML
1 INJECTION, POWDER, FOR SOLUTION INTRAMUSCULAR; INTRAVENOUS SEE ADMIN INSTRUCTIONS
Status: DISCONTINUED | OUTPATIENT
Start: 2021-01-12 | End: 2021-01-12 | Stop reason: HOSPADM

## 2021-01-12 RX ORDER — FLUMAZENIL 0.1 MG/ML
0.2 INJECTION, SOLUTION INTRAVENOUS
Status: DISCONTINUED | OUTPATIENT
Start: 2021-01-12 | End: 2021-01-12 | Stop reason: HOSPADM

## 2021-01-12 RX ORDER — BUPIVACAINE HYDROCHLORIDE 2.5 MG/ML
INJECTION, SOLUTION EPIDURAL; INFILTRATION; INTRACAUDAL PRN
Status: DISCONTINUED | OUTPATIENT
Start: 2021-01-12 | End: 2021-01-12

## 2021-01-12 RX ORDER — ONDANSETRON 4 MG/1
4 TABLET, ORALLY DISINTEGRATING ORAL EVERY 30 MIN PRN
Status: DISCONTINUED | OUTPATIENT
Start: 2021-01-12 | End: 2021-01-12 | Stop reason: HOSPADM

## 2021-01-12 RX ORDER — ONDANSETRON 2 MG/ML
4 INJECTION INTRAMUSCULAR; INTRAVENOUS EVERY 30 MIN PRN
Status: DISCONTINUED | OUTPATIENT
Start: 2021-01-12 | End: 2021-01-12 | Stop reason: HOSPADM

## 2021-01-12 RX ADMIN — GLYCOPYRROLATE 0.2 MG: 0.2 INJECTION, SOLUTION INTRAMUSCULAR; INTRAVENOUS at 11:16

## 2021-01-12 RX ADMIN — PHENYLEPHRINE HYDROCHLORIDE 100 MCG: 10 INJECTION INTRAVENOUS at 12:24

## 2021-01-12 RX ADMIN — PROPOFOL 180 MG: 10 INJECTION, EMULSION INTRAVENOUS at 10:00

## 2021-01-12 RX ADMIN — Medication 10 MG: at 12:27

## 2021-01-12 RX ADMIN — BUPIVACAINE HYDROCHLORIDE 40 ML: 2.5 INJECTION, SOLUTION EPIDURAL; INFILTRATION; INTRACAUDAL; PERINEURAL at 09:15

## 2021-01-12 RX ADMIN — FENTANYL CITRATE 50 MCG: 50 INJECTION, SOLUTION INTRAMUSCULAR; INTRAVENOUS at 09:11

## 2021-01-12 RX ADMIN — ROCURONIUM BROMIDE 10 MG: 10 INJECTION INTRAVENOUS at 11:27

## 2021-01-12 RX ADMIN — DEXAMETHASONE SODIUM PHOSPHATE 2 MG: 10 INJECTION, SOLUTION INTRAMUSCULAR; INTRAVENOUS at 09:15

## 2021-01-12 RX ADMIN — PROPOFOL 30 MG: 10 INJECTION, EMULSION INTRAVENOUS at 10:43

## 2021-01-12 RX ADMIN — MIDAZOLAM 1 MG: 1 INJECTION INTRAMUSCULAR; INTRAVENOUS at 09:11

## 2021-01-12 RX ADMIN — ROCURONIUM BROMIDE 20 MG: 10 INJECTION INTRAVENOUS at 10:40

## 2021-01-12 RX ADMIN — CEFAZOLIN 2 G: 10 INJECTION, POWDER, FOR SOLUTION INTRAVENOUS at 10:46

## 2021-01-12 RX ADMIN — SODIUM CHLORIDE, POTASSIUM CHLORIDE, SODIUM LACTATE AND CALCIUM CHLORIDE: 600; 310; 30; 20 INJECTION, SOLUTION INTRAVENOUS at 11:32

## 2021-01-12 RX ADMIN — LIDOCAINE HYDROCHLORIDE 100 MG: 20 INJECTION, SOLUTION INFILTRATION; PERINEURAL at 10:00

## 2021-01-12 RX ADMIN — FENTANYL CITRATE 100 MCG: 50 INJECTION, SOLUTION INTRAMUSCULAR; INTRAVENOUS at 11:14

## 2021-01-12 RX ADMIN — SUGAMMADEX 200 MG: 100 INJECTION, SOLUTION INTRAVENOUS at 12:45

## 2021-01-12 RX ADMIN — ROCURONIUM BROMIDE 40 MG: 10 INJECTION INTRAVENOUS at 10:00

## 2021-01-12 RX ADMIN — ONDANSETRON 4 MG: 2 INJECTION INTRAMUSCULAR; INTRAVENOUS at 12:28

## 2021-01-12 RX ADMIN — FENTANYL CITRATE 25 MCG: 50 INJECTION, SOLUTION INTRAMUSCULAR; INTRAVENOUS at 14:19

## 2021-01-12 RX ADMIN — DEXMEDETOMIDINE HYDROCHLORIDE 20 MCG: 100 INJECTION, SOLUTION INTRAVENOUS at 09:15

## 2021-01-12 RX ADMIN — PHENAZOPYRIDINE HYDROCHLORIDE 200 MG: 200 TABLET, FILM COATED ORAL at 08:55

## 2021-01-12 RX ADMIN — ACETAMINOPHEN 650 MG: 325 TABLET, FILM COATED ORAL at 15:26

## 2021-01-12 RX ADMIN — FENTANYL CITRATE 50 MCG: 50 INJECTION, SOLUTION INTRAMUSCULAR; INTRAVENOUS at 13:21

## 2021-01-12 RX ADMIN — SODIUM CHLORIDE, POTASSIUM CHLORIDE, SODIUM LACTATE AND CALCIUM CHLORIDE: 600; 310; 30; 20 INJECTION, SOLUTION INTRAVENOUS at 10:00

## 2021-01-12 RX ADMIN — ROCURONIUM BROMIDE 10 MG: 10 INJECTION INTRAVENOUS at 11:59

## 2021-01-12 ASSESSMENT — MIFFLIN-ST. JEOR: SCORE: 1290.13

## 2021-01-12 NOTE — DISCHARGE INSTRUCTIONS
If you have obstructive sleep apnea     You were given anesthesia medicine during surgery to keep you comfortable and free of pain. After surgery, you may have more apnea spells because of this medicine and other medicines you were given. The spells may last longer than usual.     At home:        Keep using the continuous positive airway pressure (CPAP) device when you sleep. Unless your health care provider tells you not to, use it when you sleep, day or night. CPAP is a common device used to treat obstructive sleep apnea.[facility]  Same-Day Surgery   Adult Discharge Orders & Instructions     For 24 hours after surgery    1. Get plenty of rest.  A responsible adult must stay with you for at least 24 hours after you leave the hospital.   2. Do not drive or use heavy equipment.  If you have weakness or tingling, don't drive or use heavy equipment until this feeling goes away.  3. Do not drink alcohol.  4. Avoid strenuous or risky activities.  Ask for help when climbing stairs.   5. You may feel lightheaded.  IF so, sit for a few minutes before standing.  Have someone help you get up.   6. If you have nausea (feel sick to your stomach): Drink only clear liquids such as apple juice, ginger ale, broth or 7-Up.  Rest may also help.  Be sure to drink enough fluids.  Move to a regular diet as you feel able.  7. You may have a slight fever. Call the doctor if your fever is over 100 F (37.7 C) (taken under the tongue) or lasts longer than 24 hours.  8. You may have a dry mouth, a sore throat, muscle aches or trouble sleeping.  These should go away after 24 hours.  9. Do not make important or legal decisions.   Call your doctor for any of the followin.  Signs of infection (fever, growing tenderness at the surgery site, a large amount of drainage or bleeding, severe pain, foul-smelling drainage, redness, swelling).    2. It has been over 8 to 10 hours since  surgery and you are still not able to urinate (pass water).    3.  Headache for over 24 hours.    To contact a doctor, call Dr Proctor at [phone] at the [facility] or:        [phone] and ask for the resident on call for GYN/ONC (Gynecology/Oncology) (answered 24 hours a day)      Emergency Department:    [facility] [place]: [phone]       (TTY for hearing impaired: [phone])    [place] [facility]: [phone]       (TTY for hearing impaired: [phone])

## 2021-01-12 NOTE — BRIEF OP NOTE
[facility]     Brief Operative Note    Pre-operative diagnosis: Endometrial cancer (H) [C54.1]  Post-operative diagnosis Same as pre-operative diagnosis    Procedure: Procedure(s):  HYSTERECTOMY, TOTAL, LAPAROSCOPIC, WITH SALPINGO-OOPHORECTOMY AND LYMPHADENECTOMY  HYSTERECTOMY, TOTAL ABDOMINAL, WITH BILATERAL SALPINGO-OOPHORECTOMY, WITH LYMPHADENECTOMY  CYSTOSCOPY  Latex Free  Surgeon: Surgeon(s) and Role:     * John Procotr MD - Primary     * Edna Steele MD - Resident - Assisting     * Isamar Joseph MD - Fellow - Assisting  Anesthesia: Combined General with Block   Estimated blood loss: Less than 50 ml  Drains: None  Specimens:   ID Type Source Tests Collected by Time Destination   1 : pelvic washings-cytology Washings Pelvis CYTOLOGY NON GYN John Proctor MD 1/12/2021 10:43 AM    A : RIGHT PELVIC SENTINEL LYMPH NODE #1  Tissue Lymph Node, Right Pelvic SURGICAL PATHOLOGY EXAM John Proctor MD 1/12/2021 10:53 AM    B : RIGHT PELVIC SENTINEL LYMPH NODE #2 Tissue Lymph Node, Right Pelvic SURGICAL PATHOLOGY EXAM John Proctor MD 1/12/2021 10:56 AM    C : LEFT PELVIC SENTINEL LYMPH NODES # 1 AND 2 Tissue Lymph Node, Left Pelvic SURGICAL PATHOLOGY EXAM John Proctor MD 1/12/2021 11:09 AM    D : uterus, bilat fallopian tubes, bilat ovaries, cervix--FROZEN ON ENDOMETRIUM ONLY Tissue Uterus, Cervix and Bilateral Fallopian Tubes SURGICAL PATHOLOGY EXAM John Proctor MD 1/12/2021 11:41 AM      Findings:   Laparoscopic survey with no evidence of disease in upper abdomen, normal appearing liver edge and diaphragm. Normal appearing small bowel with no evidence of injury on abdominal entry. Large bulky uterus with multiple subserosal and pedunculated fibroids up to a few centimeters in diameter, normal appearing tubes and ovaries bilaterally. [place] lymph nodes identified and sampled  bilaterally. Ureters also identified bilaterally. Surgical sites hemostatic at end of case. Cystoscopy performed with bilateral ureteral jets identified.  Complications: None.  Implants:  None    Edna Steele MD  Ob/Gyn Resident, PGY-3  Pager: [phone]  01/12/21 1:33 PM

## 2021-01-12 NOTE — PROGRESS NOTES
Gynecologic Oncology Postoperative Check Note  1/12/2021    S: Patient reports she is doing well, medications are helping with the pain and it is tolerable. Ambulating. She reports that she voided. No nausea or vomiting. Denies chest pain, shortness of breath, dizziness, or other concerns at this time.     O:  Vitals:    01/12/21 1445 01/12/21 1511 01/12/21 1530 01/12/21 1545   BP: 131/68 124/73 126/77 109/60   BP Location:  Right arm     Pulse: 59 71 73 57   Resp: 12 14 14 20   Temp: 97.9  F (36.6  C) 97.8  F (36.6  C)  96.9  F (36.1  C)   TempSrc: Oral Oral  Oral   SpO2: 96% 100% 97% 94%   Weight:       Height:         Gen: NAD, sitting up in chair  Cardio: RRR, S1/S2, no murmurs  Resp: CTAB, no wheezing or crackles  Abdomen: soft, appropriately tender, nondistended, incisions covered with bandages that have a small amount of serosanguinous drainage  Neuro: alert, oriented    A/P: 65 year old POD#0 s/p TLH, BSO, SLND, and cystoscopy. Doing well, meeting post-op goals and feels ready to discharge. Reviewed post-op restrictions, wound care, and answered questions.    Disease: grade 2 mixed serous/endometrioid adenocarcinoma; read on frozen as grade 1  FEN: advance diet as tolerated, regular diet  Pain: Tylenol and oxycodone PRN  Heme: Hgb 13.9> EBL 50  CV: HTN, lisinopril-hydrochlorothiazide, amlodipine; HLD; atorvastatin. Resume home meds after discharge  : s/p stokes, voiding spontaneously post-op  ID: s/p preop abx  Psych/Neuro/MSK: Arthritis, anx/dep, alert and oriented  PPX: ambulation    Follow up: scheduled with Dr. Proctor on 2/1    Dispo: discharge to home    Jorge Garnica MD  OB/GYN PGY-1  01/12/21 3:58 PM

## 2021-01-12 NOTE — H&P
[facility] History and Physical    Dee Carlos MRN# [id number]   Age: 65 year old YOB: 1955     Date of Admission:  1/12/2021    Primary care provider: Tiesha Tao             Chief Complaint:   Endometrial cancer         History of Present Illness:   This patient is a 65 year old female with grade 2 mixed serous and endometrioid endometrial carcinoma who presents for scheduled TLH, BSO, sentinel LND, and cystoscopy. She has no questions or concerns and feels well today.         Cancer Treatment History:   Postmenopausal bleeding x5 years    11/13/20: EMB (grade 1 endometrioid endometrial adenocarcinoma) and Pap (JULIET/HPV-)    Ultrasound with enlarged fibroid uterus, endometrial stripe 22mm.    12/16: Path overread as grade 2 mixed endometrioid and serous endometrial carcinoma         Past Medical History:   HTN  HLD  Arthritis  Anxiety/depression         Past Surgical History:    Tubal ligation  L knee replacement         Social History:     Social History     Tobacco Use     Smoking status: Never Smoker     Smokeless tobacco: Never Used   Substance Use Topics     Alcohol use: Yes     Alcohol/week: 7.0 standard drinks     Types: 7 Glasses of wine per week     Frequency: 4 or more times a week     Drinks per session: 1 or 2     Comment: 1 glass per day            Family History:   History reviewed. No pertinent family history.         Allergies:   No Known Allergies         Medications:   No current facility-administered medications on file prior to encounter.        amLODIPine (NORVASC) 10 MG tablet, Take 10 mg by mouth daily       atorvastatin (LIPITOR) 40 MG tablet, Take 40 mg by mouth daily       lisinopril-hydrochlorothiazide (ZESTORETIC) 20-25 MG tablet, Take 1 tablet by mouth daily             Review of Systems:     CONSTITUTIONAL: Negative for  fevers, chills, fatigue, anorexia and weight loss  SKIN: Negative for rashes, lumps, or bumps  HEENT: Negative for vision changes, changes in hearing,  "sinus drainage, sore throat  RESPIRATORY: Negative for  cough, shortness of breath, dyspnea and wheezing, hemoptysis  CARDIOVASCULAR: Negative for  chest pain, dyspnea, palpitations, edema  GASTROINTESTINAL: Negative for nausea, vomiting, change in bowel habits, diarrhea, constipation, abdominal pain, abdominal distention, reflux, hematemesis and hemtochezia  GENITOURINARY: Negative for frequency, dysuria, nocturia, urinary incontinence and hematuria  MUSCULOSKELETAL: Negative for muscle weakness, joint stiffness, joint swelling, back pain  NEUROLOGIC: Negative for headaches, syncope, weakness, numbness, tingling, memory problems, or incoordination  PSYCHIATRIC: Negative for anxiety and depression  HEMATOLOGIC/LYMPHATIC:  Negative for easy bruising, bleeding and lymphadenopathy  ENDOCRINE:  Negative for heat intolerance and cold intolerance         Physical Exam:     Vitals:    01/12/21 0808 01/12/21 0905 01/12/21 0910 01/12/21 1301   BP: 137/84 (!) 149/81 (!) 159/87 118/77   BP Location: Left arm      Pulse: 77 70 74 71   Resp: 16 14 10 23   Temp: 98.4  F (36.9  C)   97  F (36.1  C)   TempSrc: Oral   Axillary   SpO2: 100% 100% 100% 100%   Weight: 76.6 kg (168 lb 14 oz)      Height: 1.6 m (5' 3\")        Constitutional: Healthy appearing female, no acute distress  HEENT: Normal appearance.  Cardiovascular: Regular rate, well perfused  Respiratory: Clear to auscultation bilaterally without crackles or wheeze  Gastrointestinal: Abdomen soft, non-tender  Neuro: Gross movements of all extremities  Extremities: Non-tender, trace bilateral LE edema  Skin: No suspicious lesions or rashes  Psychiatric: mentation appears normal and affect normal/bright  Lines: PIV         Data:     Results for orders placed or performed during the hospital encounter of 01/12/21 (from the past 24 hour(s))   POC US Guidance Needle Placement    Impression    Routine bilateral classic TAP     Glucose by meter   Result Value Ref Range    Glucose " 100 (H) 70 - 99 mg/dL   ABO/Rh type and screen   Result Value Ref Range    ABO A     RH(D) Neg     Antibody Screen Neg     Test Valid Only At          [facility]    Specimen Expires 01/15/2021    Surgical pathology exam   Result Value Ref Range    Copath Report       Patient Name: RADHA HEADLEY  MR#: [id number]  Specimen #:   Reported: 1/12/2021 12:38  Ordering Phy(s): LEANA PRICE    +++PRELIMINARY+++  INTRAOPERATIVE DIAGNOSIS  PRELIMINARY INTRAOPERATIVE FROZEN SECTION DIAGNOSIS:  D1FS: Uterus, total hysterectomy:  -ENDOMETRIAL ADENOCARCINOMA, ENDOMETRIOID TYPE, FIGO GRADE 1  -Tumor size 4 cm  -Tumor invades less than half of the myometrial thickness    INTRAOPERATIVE COMMENT:  Pre-operative endometrial biopsy diagnosis discussed with surgical team   (see consult report [id number]).    ELECTRONICALLY SIGNED OUT BY:  Brooklynn Acosta M.D., [facility]    Status:  Signed Out  Date Ordered: 1/12/2021 12:04  Date Reported: 1/12/2021 12:38                 Assessment and Plan:   Assessment: 65 year old female with grade 2 mixed serous and endometrioid adenocarcinoma admitted for scheduled TLH, BSO, sentinel lymph node dissection, cystoscopy.     Plan:  - Reviewed consent, signed and valid  - PIV in place  - CBC, type and screen complete  - Proceed to OR    Edna Steele MD  Ob/Gyn Resident, PGY-3  01/12/21 1:14 PM

## 2021-01-12 NOTE — OR NURSING
Pt had bilateral TAP blocks done pre op without complications. Pt tolerated the procedure very well. madiha CRAWFORD

## 2021-01-12 NOTE — ANESTHESIA CARE TRANSFER NOTE
"Patient: Dee Carlos    Procedure(s):  HYSTERECTOMY, TOTAL, LAPAROSCOPIC, WITH SALPINGO-OOPHORECTOMY AND LYMPHADENECTOMY  HYSTERECTOMY, TOTAL ABDOMINAL, WITH BILATERAL SALPINGO-OOPHORECTOMY, WITH LYMPHADENECTOMY  CYSTOSCOPY  Latex Free    Diagnosis: Endometrial cancer (H) [C54.1]  Diagnosis Additional Information: No value filed.    Anesthesia Type:   General     Note:  Airway :Face Mask  Patient transferred to:PACU  Handoff Report: Identifed the Patient, Identified the Reponsible Provider, Reviewed the pertinent medical history, Discussed the surgical course, Reviewed Intra-OP anesthesia mangement and issues during anesthesia, Set expectations for post-procedure period and Allowed opportunity for questions and acknowledgement of understanding      Vitals: (Last set prior to Anesthesia Care Transfer)    CRNA VITALS  1/12/2021 1226 - 1/12/2021 1305      1/12/2021             Resp Rate (observed):  (!) 1          BP (!) 159/87   Pulse 74   Temp 36.9  C (98.4  F) (Oral)   Resp 10   Ht 1.6 m (5' 3\")   Wt 76.6 kg (168 lb 14 oz)   SpO2 100%   BMI 29.91 kg/m            Electronically Signed By: Dario Henley MD  January 12, 2021  1:05 PM  "

## 2021-01-12 NOTE — OR NURSING
The following text page was sent to Dr. Howard: PACU 12. Breanna. Pt ready for sign out please call [phone]. Thanks! Aki CRAWFORD

## 2021-01-12 NOTE — ANESTHESIA PROCEDURE NOTES
Peripheral Nerve Block Procedure Note      Staff -   Anesthesiologist:  Ranjan Summers MD  Resident/Fellow: Moise Weber III, MD  Performed By: resident  Procedure performed by resident/CRNA in presence of a teaching physician.    Location: Pre-op  Procedure Start/Stop TImes:     patient identified, IV checked, site marked, risks and benefits discussed, informed consent, monitors and equipment checked, pre-op evaluation, at physician/surgeon's request and post-op pain management      Correct Patient: Yes      Correct Position: Yes      Correct Site: Yes      Correct Procedure: Yes      Correct Laterality:  Yes    Site Marked:  Yes  Procedure details:     Procedure:  TAP    Diagnosis:  Post operative pain    Laterality:  Bilateral    Position:  Supine    Sterile Prep: chloraprep, mask and sterile gloves      Local skin infiltration:  None    Needle:  Short bevel    Needle gauge:  21    Needle length (mm):  110    Ultrasound: Yes      Ultrasound used to identify targeted nerve, plexus, or vascular structure and placed a needle adjacent to it      Permanent Image entered into patiient's record      Abnormal pain on injection: No      Blood Aspirated: No      Paresthesias:  No    Bleeding at site: No      Bolus via:  Needle    Infusion Method:  Single Shot    Complications:  None  Assessment/Narrative:     Injection made incrementally with aspirations every (mL):  5     Routine bilateral classic TAP

## 2021-01-12 NOTE — ANESTHESIA PROCEDURE NOTES
Airway   Date/Time: 1/12/2021 10:07 AM   Patient location during procedure: OR    Staff -   Anesthesiologist:  Colten Howard MD  Resident/Fellow: Dario Henley MD  Performed By: resident    Consent for Airway   Urgency: elective    Indications and Patient Condition  Indications for airway management: daniel-procedural  Induction type:intravenousMask difficulty assessment: 1 - vent by mask    Final Airway Details  Final airway type: endotracheal airway  Successful airway:ETT - single  Endotracheal Airway Details   ETT size (mm): 7.0  Cuffed: yes  Successful intubation technique: direct laryngoscopy  Grade View of Cords: 1  Adjucts: stylet  Measured from: lips  Secured at (cm): 22  Secured with: pink tape, plastic tape and silk tape  Bite block used: None    Post intubation assessment   Placement verified by: capnometry and chest rise   Number of attempts at approach: 2 (Initial attempt by MS4)  Secured with:pink tape, plastic tape and silk tape  Ease of procedure: easy  Dentition: Intact and Unchanged

## 2021-01-12 NOTE — ANESTHESIA POSTPROCEDURE EVALUATION
Anesthesia POST Procedure Evaluation    Patient: Dee Carlos   MRN:     [id number] Gender:   female   Age:    65 year old :      1955        Preoperative Diagnosis: Endometrial cancer (H) [C54.1]   Procedure(s):  HYSTERECTOMY, TOTAL, LAPAROSCOPIC, WITH SALPINGO-OOPHORECTOMY AND LYMPHADENECTOMY  HYSTERECTOMY, TOTAL ABDOMINAL, WITH BILATERAL SALPINGO-OOPHORECTOMY, WITH LYMPHADENECTOMY  CYSTOSCOPY  Latex Free   Postop Comments: No value filed.     Anesthesia Type: General       Disposition: Outpatient   Postop Pain Control: Uneventful            Sign Out: Well controlled pain   PONV: No   Neuro/Psych: Uneventful            Sign Out: Acceptable/Baseline neuro status   Airway/Respiratory: Uneventful            Sign Out: Acceptable/Baseline resp. status   CV/Hemodynamics: Uneventful            Sign Out: Acceptable CV status   Other NRE: NONE   DID A NON-ROUTINE EVENT OCCUR? No         Last Anesthesia Record Vitals:  CRNA VITALS  2021 1226 - 2021 1326      2021             Resp Rate (observed):  (!) 1          Last PACU Vitals:  Vitals Value Taken Time   /68 21 1445   Temp 35.9  C (96.6  F) 21 1330   Pulse 62 21 1447   Resp 19 21 1430   SpO2 99 % 21 1447   Temp src     NIBP     Pulse     SpO2     Resp     Temp     Ht Rate     Temp 2     Vitals shown include unvalidated device data.      Electronically Signed By: Colten Howard MD, 2021, 2:48 PM

## 2021-01-13 ENCOUNTER — PATIENT OUTREACH (OUTPATIENT)
Dept: ONCOLOGY | Facility: CLINIC | Age: 66
End: 2021-01-13

## 2021-01-13 LAB — COPATH REPORT: NORMAL

## 2021-01-13 NOTE — OP NOTE
GYNECOLOGIC ONCOLOGY OPERATION NOTE     PATIENT: Dee Carlos     MRN: [id number]     DATE OF SURGERY: 1/12/2020     PREOPERATIVE DIAGNOSES:   Grade 1 Endometrioid endometrial CA      POSTOPERATIVE DIAGNOSES:   Grade 1 Endometrial cancer     OPERATIVE PROCEDURES:   Total laparoscopic hysterectomy, bilateral salpingo-oophorectomy, bilateral sentinel lymph node dissection, cystoscopy     SURGEON: John Proctor MD - Primary     ASSISTANTS:   1. Isamar Joseph MD, 3rd year fellow  2. Edna Steele, PGY 3  resident     ANESTHESIA: General endotracheal.      ESTIMATED BLOOD LOSS: 50 mL     TOTAL INPUT: See anesthesia records       TOTAL URINE OUTPUT: See anesthesia records      TRANSFUSIONS: None      DRAIN: stokes cathether      SPECIMENS REMOVED:    1.  Pelvic washings  2.  Left and right sentinel lymph nodes  3.  Uterus, cervix, bilateral ovaries and fallopian tubes      OPERATIVE FINDINGS:      COMPLICATIONS: None noted.      CONDITION: Extubated, stable on transfer.      INDICATIONS FOR PROCEDURE: This patient is a 84 year old woman who was [facility] for consultation in light of newly diagnosed grade 3 endometrial cancer. Treatment options were reviewed and the patient ultimately consented to the above procedure.     OPERATIVE PROCEDURE IN DETAIL: On EUA the patient had normal external genitalia and a bulky, mobile uterus.     On laparoscopy there was a large bulky uterus with multiple subserosal and pedunculated fibroids up to a few centimeters in diameter The bilateral adnexa were grossly normal in appearance.  There were easily identifiable sentinel lymph nodes bilaterally along the external iliac vessels. The remainder of the abdominal and pelvic surveys were unremarkable.  On cystoscopy there was no evidence of bladder injury or foreign body and there was noted to be brisk efflux from the bilateral ureteral orifices.     INDICATIONS:   Dee Carlos  is a 65 year old who initially presented with a diagnosis of grade 1 endometrial adenocarcinoma.  Following a thorough discussion of the risks, benefits, indications and alternatives she consented to the above procedure.    COMPLICATIONS: None.      CONDITION: Stable to PACU.      PROCEDURE:   Consent was reviewed with the patient in the preoperative setting and confirmed. She received prophylactic antibiotics. In addition, she received heparin for venous thrombosis prevention. The patient was transferred to the operating room and placed in dorsal supine position. General anesthetic was obtained in the usual manner without noted difficulties. The patient was then positioned onto Pablo stirrups and an exam under anesthesia was performed with findings as described above.  The patient was prepped and draped for the above-mentioned procedure and House catheter was then placed under sterile techniques.  Timeout was called at which point the patient's name, procedure and operative site was confirmed by the operative team. Initially, the instruments for the vaginal manipulator were positioned, a speculum was placed in the vagina. The anterior lip of the cervix was grasped, the uterus sounded to 12 cm and cervix was serially dilated. The cervix was injected with 2 cc of ICG at the 3 and 9 o'clock positions.  The Elma vaginal manipulator was then inserted and the vaginal balloon was then inserted to obtain intraabdominal pressure.      Attention was then turned to the upper abdomen. Initially, an incision was made at the umbilicus and the Veress needle introduced through this stab incision. The abdomen was insufflated with an opening pressure of 0 mmHg. The Veress needle was removed. The initial midline 5 mm port was inserted without difficulties and initial survey revealed no damage to underlying structures.  The right lateral 12 mm and medial 5 mm ports and left 12 mm port were then placed under direct visualization  without any injury noted to underlying structures. At this point, the patient was placed into steep Trendelenburg. The pelvis was inspected as well as the upper abdomen with findings as noted above. Pelvic washings were obtained and sent for cytology. Bowels were packed up into the upper abdomen with gentle traction.      Attention was then turned to the pelvis. The round ligaments were identified bilaterally. They were divided using cautery and the retroperitoneal space was entered by dissecting the peritoneum lateral and cephalad to the IP ligaments. The ureters were identified and a defect was made underneath the IP ligament and above the ureter. The IP ligament was serially cauterized and then divided sharply.  The spaces of the pelvic lymph node dissection were then opened and the sentinel lymph nodes were identified as above.  The right and left sentinel lymph nodes were then carefully dissected out ensuring the safety of the surrounding structures, removed from the abdomen and sent for pathology. The rest of the peritoneum was skeletonized down to the level of the uterine arteries which were then cauterized and divided.  The bladder flap was created using cautery down to just below the level of the uterine manipulator cup. The rest of the parametrial tissue was dissected off of the uterus serially cauterized and divided sharply. A colpotomy was made on the anterior side and carried around to the posterior side of the uterine manipulator cup using the electrocautery. The uterus was removed through the vagina and sent for pathology.       The vaginal cuff was then closed using the EndoStitch device and 2-0 Vicryl sutures in a figure-of-eight fashion  with excellent hemostasis and reapproximation.  Next, we performed cystoscopy using 30-degree angled scope and noted normal bladder mucosa, no evidence of foreign body and brisk efflux from the bilateral ureteral orifices. We then re-examined the pelvis and noted  good hemostasis. At this point, the vaginal balloon was removed from the vagina. We closed the fascia on the 12 mm incisions using the Elan-Cerda device. All laparoscopic instruments and ports were now removed and CO2 allowed to escape from the abdomen. Skin was reapproximated with 4-0 Vicryl sutures and Dermabond applied. The patient tolerated the procedure well and was taken to the recovery room in stable condition.     Sponge, lap and needle counts were reported as correct x2 and instrument count was correct x1.     Dr. Proctor was present and scrubbed for the entire case.     Isamar Joseph  Gynecologic Oncology Fellow  [facility]

## 2021-01-15 ENCOUNTER — PATIENT OUTREACH (OUTPATIENT)
Dept: ONCOLOGY | Facility: CLINIC | Age: 66
End: 2021-01-15

## 2021-01-21 NOTE — PROGRESS NOTES
Spoke with [employer]  Explained no papers have been received yet  [employer] rep says papers will be refaxed  Reviewed information on surgery  Willl call back if futher information needed

## 2021-01-21 NOTE — PROGRESS NOTES
Message was left on voice mail  Fax was sent from [employer], was this received?  Case #[id number]

## 2021-01-22 NOTE — PROGRESS NOTES
Spoke with unum  Reviewed surgery dates  Confirmed procedure follow up appts   No further information needed

## 2021-01-31 NOTE — PROGRESS NOTES
Consult Notes on Referred Patient    Dr. Evy Morton MD  [facility]  [address] [facility] [place] [address]       RE: Dee Carlos  : 1955  IAN: 2021     Dear Dr. Evy Morton:    I had the pleasure of seeing your patient Dee Carlos here at the Gynecologic Cancer Clinic at the [facility] ER on 2020.  As you know she is a very pleasant 65 year old woman with a recent diagnosis of endometrial cancer.  Given these findings she was subsequently sent to the Gynecologic Cancer Clinic for new patient consultation.     HPI  She was recently evaluated by Dr. Morton for PMB.  This evaluation resulted in an US which demonstrated fibroids and a thickened EMS.  A pap and biopsy were obtained (HPV negative, JULIET cells).  Biopsy demonstarted grade 1 EMCA    21 L/S staging  PATH:  FINAL DIAGNOSIS:   A. SENTINEL LYMPH NODE, RIGHT PELVIC #1, EXCISION:   - One reactive lymph node, negative for malignancy (0/1)     B. SENTINEL LYMPH NODE, RIGHT PELVIC #2, EXCISION:   - Two reactive lymph nodes, negative for malignancy (0/2)     C. SENTINEL LYMPH NODE, LEFT PELVIC 1 AND 2, LYMPHADENECTOMY:   - Two reactive lymph nodes, negative for malignancy (0/2)     D. UTERUS, BILATERAL FALLOPIAN TUBES AND OVARIES, TOTAL LAPAROSCOPIC   HYSTERECTOMY WITH BILATERAL   SALPINGO-OOPHORECTOMY:   - Endometrial adenocarcinoma, mixed cell type, endometrioid, FIGO grade 2   and serous carcinoma   - Leiomyomas with degenerative changes and focal calcification   - Cervix with squamous metaplasia and Nabothian cysts   - Left ovary with cortical inclusion cysts   - Bilateral ovaries with atrophic changes   - Bilateral fallopian tubes with no significant histologic abnormalities     Synoptic Report:       Histologic Type:         - Mixed cell carcinoma (types and percentages) - Endometrioid (FIGO   grade         2, approximately 60%) and serous (approximately 40%)     Tumor  Size: 4.0 x 3.5 x 0.6 Centimeters (cm)     Myometrial Invasion:         - Present       Depth of Myometrial Invasion (Millimeters): 1 mm       Myometrial Thickness (Millimeters): 21 mm       Percentage of Myometrial Invasion: 5%     Adenomyosis:         - Not identified     Uterine Serosa Involvement:         - Not identified     Lower Uterine Segment Involvement:         - Not identified     Cervical Stromal Involvement:         - Not identified     Other Tissue / Organ Involvement:         - Not identified     Peritoneal Ascitic Fluid:         - Negative for malignancy (normal / benign)     Lymphovascular Invasion:         - Not identified     LYMPH NODES     Lymph Node Status:         - All lymph nodes negative for tumor cells     Total Number of Pelvic Nodes Examined:         5     Number of Pelvic [place] Nodes Examined:         5     Total Number of Para-aortic Nodes Examined:         0     Number of Para-aortic [place] Nodes Examined:         0     PATHOLOGIC STAGE CLASSIFICATION (PTNM, AJCC 8TH EDITION)     Primary Tumor (pT):         - pT1a     Regional Lymph Nodes (pN):         - pN0     FIGO STAGE     FIGO Stage:         - IA        TEST(S) PERFORMED     MLH1:         - Intact nuclear expression     MSH2:         - Intact nuclear expression     MSH6:         - Intact nuclear expression     PMS2:         - Intact nuclear expression     Immunohistochemistry (IHC) Interpretation for Mismatch Repair (MMR)     Proteins:         - No loss of nuclear expression of MMR proteins: low probability of         microsatellite instability-high (MSI-H)       Microsatellite Instability (MSI):         - MSI-Stable (JENNY)       Review of Systems:    Systemic           no weight changes; no fever; no chills; no night sweats; no appetite changes  Skin           no rashes, or lesions  Eye           no irritation; no changes in vision  Heriberto-Laryngeal           no dysphagia; no hoarseness   Pulmonary    no cough; no shortness of  breath  Cardiovascular    no chest pain; no palpitations  Gastrointestinal    no diarrhea; no constipation; no abdominal pain; no changes in bowel  habits; no blood in stool  Genitourinary   no urinary frequency; no urinary urgency; no dysuria; no pain; no abnormal vaginal discharge; no abnormal vaginal bleeding  Breast   no breast discharge; no breast changes; no breast pain  Musculoskeletal    no myalgias; no arthralgias; no back pain  Psychiatric           no depressed mood; no anxiety    Hematologic           no tender lymph nodes; no noticeable swellings or lumps   Endocrine    no hot flashes; no heat/cold intolerance         Neurological   no tremor; no numbness and tingling; no headaches; no difficulty  sleeping      Past Medical History:    No past medical history on file.      Past Surgical History:    Past Surgical History:   Procedure Laterality Date     CYSTOSCOPY N/A 1/12/2021    Procedure: CYSTOSCOPY  Latex Free;  Surgeon: John Proctor MD;  Location: [facility] OR     LAPAROSCOPIC HYSTERECTOMY TOTAL, BILATERAL SALPINGO-OOPHORECTOMY, NODE DISSECTION, COMBINED N/A 1/12/2021    Procedure: HYSTERECTOMY, TOTAL, LAPAROSCOPIC, WITH SALPINGO-OOPHORECTOMY AND LYMPHADENECTOMY;  Surgeon: John Proctor MD;  Location: [facility] OR         Health Maintenance:  Health Maintenance Due   Topic Date Due     PREVENTIVE CARE VISIT  1955     ADVANCE CARE PLANNING  1955     MAMMO SCREENING  1955     COLORECTAL CANCER SCREENING  06/06/1967     HIV SCREENING  06/06/1972     HEPATITIS C SCREENING  06/06/1975     DTAP/TDAP/TD IMMUNIZATION (1 - Tdap) 06/06/1982     LIPID  06/06/2002     ZOSTER IMMUNIZATION (2 of 3) 12/22/2017     PHQ-2  01/01/2021       Last Pap Smear: See above    Last Mammogram: birads 2; 2020     Last Colonoscopy: none      Current Medications:     has a current medication list which includes the following prescription(s): amlodipine, atorvastatin, lisinopril-hydrochlorothiazide,  and oxycodone.       Allergies:      No Known Allergies       Social History:     Social History     Tobacco Use     Smoking status: Never Smoker     Smokeless tobacco: Never Used   Substance Use Topics     Alcohol use: Yes     Alcohol/week: 7.0 standard drinks     Types: 7 Glasses of wine per week     Frequency: 4 or more times a week     Drinks per session: 1 or 2     Comment: 1 glass per day       History   Drug Use Unknown           Family History:     The patient's family history is notable for no pberast, colon, ovary, uterine CA.      Physical Exam:   PS 0  VS: BP (!) 147/87   Pulse 89   Temp 99  F (37.2  C) (Oral)   Wt 76.5 kg (168 lb 9.6 oz)   SpO2 99%   BMI 29.87 kg/m       General Appearance: healthy and alert, no distress     HEENT:  no thyromegaly, no palpable nodules or masses        Cardiovascular: regular rate and rhythm, no gallops, rubs or murmurs     Respiratory: lungs clear, no rales, rhonchi or wheezes, normal diaphragmatic excursion    Musculoskeletal: extremities non tender and without edema    Skin: no lesions or rashes     Neurological: normal gait, no gross defects     Psychiatric: appropriate mood and affect                 Gastrointestinal:       abdomen soft, non-tender, non-distended, no organomegaly or masses, Incisions well helaling  Genitourinary: deferred      Assessment:    Dee Carlos is a 65 year old woman with a new diagnosis of EMCA.     A total of 30 minutes was spent with the patient, 20 minutes of which were spent in counseling the patient and/or treatment planning.      Plan:     1.)  Stage IA G2/serous EMCA - we have discussed findings, including the finding of her2 positivity.  She is ware that the standard of care in a stage one or mixed histology case is entirely unclear.  I have recommended chemotherapy based on a relatively higher risk of recurrence for serous (the minority component) cancer; but we discussed the entirety of the NCCN guideline recommendations  through observation.  After a comprehensive discussion of the pros and cons of each, she is inclined to observe for now.  She understands that recurrence cost frequently occurs at the vaginal duff, but could occur anywhere and be more challenging to treat in that setting.  I have left open the opportunity for her to change her mind to a more aggressive approach, but believe she is making an informed decision.  She clearly understands the importance of follow-up especially in the absence of adjuvant treatment.     2.) Genetic risk factors were assessed and the patient does not meet the qualifications for a referral.      3.) Labs and/or tests ordered include:  none     4.) Health maintenance issues addressed today include none.    Thank you for allowing us to participate in the care of your patient.         Sincerely,    John Proctor MD      CC  Patient Care Team:  Tiesha Tao MD as PCP - General  Esthela, John Marroquin MD as Assigned Cancer Care Provider  BRANDY LUCAS

## 2021-02-01 ENCOUNTER — ONCOLOGY VISIT (OUTPATIENT)
Dept: ONCOLOGY | Facility: CLINIC | Age: 66
End: 2021-02-01
Attending: OBSTETRICS & GYNECOLOGY
Payer: COMMERCIAL

## 2021-02-01 VITALS
BODY MASS INDEX: 29.87 KG/M2 | HEART RATE: 89 BPM | WEIGHT: 168.6 LBS | TEMPERATURE: 99 F | SYSTOLIC BLOOD PRESSURE: 147 MMHG | OXYGEN SATURATION: 99 % | DIASTOLIC BLOOD PRESSURE: 87 MMHG

## 2021-02-01 DIAGNOSIS — C54.1 ENDOMETRIAL CANCER (H): Primary | ICD-10-CM

## 2021-02-01 PROCEDURE — 99214 OFFICE O/P EST MOD 30 MIN: CPT | Performed by: OBSTETRICS & GYNECOLOGY

## 2021-02-01 PROCEDURE — G0463 HOSPITAL OUTPT CLINIC VISIT: HCPCS

## 2021-02-01 ASSESSMENT — PAIN SCALES - GENERAL: PAINLEVEL: NO PAIN (0)

## 2021-02-01 NOTE — NURSING NOTE
"Oncology Rooming Note    February 1, 2021 1:23 PM   Dee Carlos is a 65 year old female who presents for:    Chief Complaint   Patient presents with     Oncology Clinic Visit     endometrial cancer     Initial Vitals: BP (!) 147/87   Pulse 89   Temp 99  F (37.2  C) (Oral)   Wt 76.5 kg (168 lb 9.6 oz)   SpO2 99%   BMI 29.87 kg/m   Estimated body mass index is 29.87 kg/m  as calculated from the following:    Height as of 1/12/21: 1.6 m (5' 3\").    Weight as of this encounter: 76.5 kg (168 lb 9.6 oz). Body surface area is 1.84 meters squared.  No Pain (0) Comment: Data Unavailable   No LMP recorded. Patient is postmenopausal.  Allergies reviewed: Yes  Medications reviewed: Yes    Medications: Medication refills not needed today.  Pharmacy name entered into [employer]: [facility], [place] - [id number]    Clinical concerns: none       Pema Argueta CMA            "

## 2021-02-01 NOTE — LETTER
2021         RE: Dee Carlos  [address]  [place] [address]        Dear Colleague,    Thank you for referring your patient, Dee Carlos, to the [facility] CAN[facility]. Please see a copy of my visit note below.                            Consult Notes on Referred Patient    Dr. Evy Morton MD  [facility]  [address] [place] [address]       RE: Dee Carlos  : 1955  IAN: 2021     Dear Dr. Evy Morton:    I had the pleasure of seeing your patient Dee Carlos here at the Gynecologic Cancer Clinic at the [facility] on 2020.  As you know she is a very pleasant 65 year old woman with a recent diagnosis of endometrial cancer.  Given these findings she was subsequently sent to the Gynecologic Cancer Clinic for new patient consultation.     HPI  She was recently evaluated by Dr. Morton for PMB.  This evaluation resulted in an US which demonstrated fibroids and a thickened EMS.  A pap and biopsy were obtained (HPV negative, JULIET cells).  Biopsy demonstarted grade 1 EMCA    21 L/S staging  PATH:  FINAL DIAGNOSIS:   A. SENTINEL LYMPH NODE, RIGHT PELVIC #1, EXCISION:   - One reactive lymph node, negative for malignancy (0/1)     B. SENTINEL LYMPH NODE, RIGHT PELVIC #2, EXCISION:   - Two reactive lymph nodes, negative for malignancy (0/2)     C. SENTINEL LYMPH NODE, LEFT PELVIC 1 AND 2, LYMPHADENECTOMY:   - Two reactive lymph nodes, negative for malignancy (0/2)     D. UTERUS, BILATERAL FALLOPIAN TUBES AND OVARIES, TOTAL LAPAROSCOPIC   HYSTERECTOMY WITH BILATERAL   SALPINGO-OOPHORECTOMY:   - Endometrial adenocarcinoma, mixed cell type, endometrioid, FIGO grade 2   and serous carcinoma   - Leiomyomas with degenerative changes and focal calcification   - Cervix with squamous metaplasia and Nabothian cysts   - Left ovary with cortical inclusion cysts   - Bilateral ovaries with atrophic changes   - Bilateral fallopian  tubes with no significant histologic abnormalities     Synoptic Report:       Histologic Type:         - Mixed cell carcinoma (types and percentages) - Endometrioid (FIGO   grade         2, approximately 60%) and serous (approximately 40%)     Tumor Size: 4.0 x 3.5 x 0.6 Centimeters (cm)     Myometrial Invasion:         - Present       Depth of Myometrial Invasion (Millimeters): 1 mm       Myometrial Thickness (Millimeters): 21 mm       Percentage of Myometrial Invasion: 5%     Adenomyosis:         - Not identified     Uterine Serosa Involvement:         - Not identified     Lower Uterine Segment Involvement:         - Not identified     Cervical Stromal Involvement:         - Not identified     Other Tissue / Organ Involvement:         - Not identified     Peritoneal Ascitic Fluid:         - Negative for malignancy (normal / benign)     Lymphovascular Invasion:         - Not identified     LYMPH NODES     Lymph Node Status:         - All lymph nodes negative for tumor cells     Total Number of Pelvic Nodes Examined:         5     Number of Pelvic [place] Nodes Examined:         5     Total Number of Para-aortic Nodes Examined:         0     Number of Para-aortic [place] Nodes Examined:         0     PATHOLOGIC STAGE CLASSIFICATION (PTNM, AJCC 8TH EDITION)     Primary Tumor (pT):         - pT1a     Regional Lymph Nodes (pN):         - pN0     FIGO STAGE     FIGO Stage:         - IA        TEST(S) PERFORMED     MLH1:         - Intact nuclear expression     MSH2:         - Intact nuclear expression     MSH6:         - Intact nuclear expression     PMS2:         - Intact nuclear expression     Immunohistochemistry (IHC) Interpretation for Mismatch Repair (MMR)     Proteins:         - No loss of nuclear expression of MMR proteins: low probability of         microsatellite instability-high (MSI-H)       Microsatellite Instability (MSI):         - MSI-Stable (JENNY)       Review of Systems:    Systemic           no weight  changes; no fever; no chills; no night sweats; no appetite changes  Skin           no rashes, or lesions  Eye           no irritation; no changes in vision  Heriberto-Laryngeal           no dysphagia; no hoarseness   Pulmonary    no cough; no shortness of breath  Cardiovascular    no chest pain; no palpitations  Gastrointestinal    no diarrhea; no constipation; no abdominal pain; no changes in bowel  habits; no blood in stool  Genitourinary   no urinary frequency; no urinary urgency; no dysuria; no pain; no abnormal vaginal discharge; no abnormal vaginal bleeding  Breast   no breast discharge; no breast changes; no breast pain  Musculoskeletal    no myalgias; no arthralgias; no back pain  Psychiatric           no depressed mood; no anxiety    Hematologic           no tender lymph nodes; no noticeable swellings or lumps   Endocrine    no hot flashes; no heat/cold intolerance         Neurological   no tremor; no numbness and tingling; no headaches; no difficulty  sleeping      Past Medical History:    No past medical history on file.      Past Surgical History:    Past Surgical History:   Procedure Laterality Date     CYSTOSCOPY N/A 1/12/2021    Procedure: CYSTOSCOPY  Latex Free;  Surgeon: John Proctor MD;  Location: [facility] OR     LAPAROSCOPIC HYSTERECTOMY TOTAL, BILATERAL SALPINGO-OOPHORECTOMY, NODE DISSECTION, COMBINED N/A 1/12/2021    Procedure: HYSTERECTOMY, TOTAL, LAPAROSCOPIC, WITH SALPINGO-OOPHORECTOMY AND LYMPHADENECTOMY;  Surgeon: John Proctor MD;  Location:  OR         Health Maintenance:  Health Maintenance Due   Topic Date Due     PREVENTIVE CARE VISIT  1955     ADVANCE CARE PLANNING  1955     MAMMO SCREENING  1955     COLORECTAL CANCER SCREENING  06/06/1967     HIV SCREENING  06/06/1972     HEPATITIS C SCREENING  06/06/1975     DTAP/TDAP/TD IMMUNIZATION (1 - Tdap) 06/06/1982     LIPID  06/06/2002     ZOSTER IMMUNIZATION (2 of 3) 12/22/2017     PHQ-2  01/01/2021        Last Pap Smear: See above    Last Mammogram: birads 2; 2020     Last Colonoscopy: none      Current Medications:     has a current medication list which includes the following prescription(s): amlodipine, atorvastatin, lisinopril-hydrochlorothiazide, and oxycodone.       Allergies:      No Known Allergies       Social History:     Social History     Tobacco Use     Smoking status: Never Smoker     Smokeless tobacco: Never Used   Substance Use Topics     Alcohol use: Yes     Alcohol/week: 7.0 standard drinks     Types: 7 Glasses of wine per week     Frequency: 4 or more times a week     Drinks per session: 1 or 2     Comment: 1 glass per day       History   Drug Use Unknown           Family History:     The patient's family history is notable for no pberast, colon, ovary, uterine CA.      Physical Exam:   PS 0  VS: BP (!) 147/87   Pulse 89   Temp 99  F (37.2  C) (Oral)   Wt 76.5 kg (168 lb 9.6 oz)   SpO2 99%   BMI 29.87 kg/m       General Appearance: healthy and alert, no distress     HEENT:  no thyromegaly, no palpable nodules or masses        Cardiovascular: regular rate and rhythm, no gallops, rubs or murmurs     Respiratory: lungs clear, no rales, rhonchi or wheezes, normal diaphragmatic excursion    Musculoskeletal: extremities non tender and without edema    Skin: no lesions or rashes     Neurological: normal gait, no gross defects     Psychiatric: appropriate mood and affect                 Gastrointestinal:       abdomen soft, non-tender, non-distended, no organomegaly or masses, Incisions well helaling  Genitourinary: deferred      Assessment:    Dee Carlos is a 65 year old woman with a new diagnosis of EMCA.     A total of 30 minutes was spent with the patient, 20 minutes of which were spent in counseling the patient and/or treatment planning.      Plan:     1.)  Stage IA G2/serous EMCA - we have discussed findings, including the finding of her2 positivity.  She is ware that the standard of  care in a stage one or mixed histology case is entirely unclear.  I have recommended chemotherapy based on a relatively higher risk of recurrence for serous (the minority component) cancer; but we discussed the entirety of the NCCN guideline recommendations through observation.  After a comprehensive discussion of the pros and cons of each, she is inclined to observe for now.  She understands that recurrence cost frequently occurs at the vaginal duff, but could occur anywhere and be more challenging to treat in that setting.  I have left open the opportunity for her to change her mind to a more aggressive approach, but believe she is making an informed decision.  She clearly understands the importance of follow-up especially in the absence of adjuvant treatment.     2.) Genetic risk factors were assessed and the patient does not meet the qualifications for a referral.      3.) Labs and/or tests ordered include:  none     4.) Health maintenance issues addressed today include none.    Thank you for allowing us to participate in the care of your patient.         Sincerely,    John Proctor MD      CC  Patient Care Team:  Tiesha Tao MD as PCP - General  Esthela, John Marroquin MD as Assigned Cancer Care Provider  BRANDY LUCAS

## 2021-02-07 LAB — COPATH REPORT: NORMAL

## 2021-02-09 LAB — COPATH REPORT: NORMAL

## 2021-03-29 PROBLEM — C54.1 ENDOMETRIAL CANCER (H): Status: ACTIVE | Noted: 2020-12-15

## 2021-05-09 NOTE — PROGRESS NOTES
Consult Notes on Referred Patient    Dr. Evy Morton MD  [facility]  [address] [place] [address]       RE: Dee Carlos  : 1955  IAN: 5/10/2021     Dear Dr. Evy Morton:    I had the pleasure of seeing your patient Dee Carlos here at the Gynecologic Cancer Clinic at the [facility] on 2020.  As you know she is a very pleasant 65 year old woman with a recent diagnosis of endometrial cancer.  Given these findings she was subsequently sent to the Gynecologic Cancer Clinic for new patient consultation.     HPI  She was recently evaluated by Dr. Morton for PMB.  This evaluation resulted in an US which demonstrated fibroids and a thickened EMS.  A pap and biopsy were obtained (HPV negative, JULIET cells).  Biopsy demonstarted grade 1 EMCA    21 L/S staging    PATH:  FINAL DIAGNOSIS:   A. SENTINEL LYMPH NODE, RIGHT PELVIC #1, EXCISION:   - One reactive lymph node, negative for malignancy (0/1)     B. SENTINEL LYMPH NODE, RIGHT PELVIC #2, EXCISION:   - Two reactive lymph nodes, negative for malignancy (0/2)     C. SENTINEL LYMPH NODE, LEFT PELVIC 1 AND 2, LYMPHADENECTOMY:   - Two reactive lymph nodes, negative for malignancy (0/2)     D. UTERUS, BILATERAL FALLOPIAN TUBES AND OVARIES, TOTAL LAPAROSCOPIC   HYSTERECTOMY WITH BILATERAL   SALPINGO-OOPHORECTOMY:   - Endometrial adenocarcinoma, mixed cell type, endometrioid, FIGO grade 2   and serous carcinoma   - Leiomyomas with degenerative changes and focal calcification   - Cervix with squamous metaplasia and Nabothian cysts   - Left ovary with cortical inclusion cysts   - Bilateral ovaries with atrophic changes   - Bilateral fallopian tubes with no significant histologic abnormalities     Synoptic Report:       Histologic Type:         - Mixed cell carcinoma (types and percentages) - Endometrioid (FIGO   grade         2, approximately 60%) and serous (approximately 40%)     Tumor  Size: 4.0 x 3.5 x 0.6 Centimeters (cm)     Myometrial Invasion:         - Present       Depth of Myometrial Invasion (Millimeters): 1 mm       Myometrial Thickness (Millimeters): 21 mm       Percentage of Myometrial Invasion: 5%     Adenomyosis:         - Not identified     Uterine Serosa Involvement:         - Not identified     Lower Uterine Segment Involvement:         - Not identified     Cervical Stromal Involvement:         - Not identified     Other Tissue / Organ Involvement:         - Not identified     Peritoneal Ascitic Fluid:         - Negative for malignancy (normal / benign)     Lymphovascular Invasion:         - Not identified     LYMPH NODES     Lymph Node Status:         - All lymph nodes negative for tumor cells     Total Number of Pelvic Nodes Examined:         5     Number of Pelvic [place] Nodes Examined:         5     Total Number of Para-aortic Nodes Examined:         0     Number of Para-aortic [place] Nodes Examined:         0     PATHOLOGIC STAGE CLASSIFICATION (PTNM, AJCC 8TH EDITION)     Primary Tumor (pT):         - pT1a     Regional Lymph Nodes (pN):         - pN0     FIGO STAGE     FIGO Stage:         - IA        TEST(S) PERFORMED     MLH1:         - Intact nuclear expression     MSH2:         - Intact nuclear expression     MSH6:         - Intact nuclear expression     PMS2:         - Intact nuclear expression     Immunohistochemistry (IHC) Interpretation for Mismatch Repair (MMR)     Proteins:         - No loss of nuclear expression of MMR proteins: low probability of         microsatellite instability-high (MSI-H)       Microsatellite Instability (MSI):         - MSI-Stable (JENNY)       Review of Systems:    Systemic           no weight changes; no fever; no chills; no night sweats; no appetite changes  Skin           no rashes, or lesions  Eye           no irritation; no changes in vision  Heriberto-Laryngeal           no dysphagia; no hoarseness   Pulmonary    no cough; no shortness of  breath  Cardiovascular    no chest pain; no palpitations  Gastrointestinal    no diarrhea; no constipation; no abdominal pain; no changes in bowel  habits; no blood in stool  Genitourinary   no urinary frequency; no urinary urgency; no dysuria; no pain; no abnormal vaginal discharge; no abnormal vaginal bleeding  Breast   no breast discharge; no breast changes; no breast pain  Musculoskeletal    no myalgias; no arthralgias; no back pain  Psychiatric           no depressed mood; no anxiety    Hematologic           no tender lymph nodes; no noticeable swellings or lumps   Endocrine    no hot flashes; no heat/cold intolerance         Neurological   no tremor; no numbness and tingling; no headaches; no difficulty  sleeping      Past Medical History:    No past medical history on file.      Past Surgical History:    Past Surgical History:   Procedure Laterality Date     CYSTOSCOPY N/A 1/12/2021    Procedure: CYSTOSCOPY  Latex Free;  Surgeon: John Proctor MD;  Location: [facility] OR     LAPAROSCOPIC HYSTERECTOMY TOTAL, BILATERAL SALPINGO-OOPHORECTOMY, NODE DISSECTION, COMBINED N/A 1/12/2021    Procedure: HYSTERECTOMY, TOTAL, LAPAROSCOPIC, WITH SALPINGO-OOPHORECTOMY AND LYMPHADENECTOMY;  Surgeon: John Proctor MD;  Location: [facility] OR         Health Maintenance:  Health Maintenance Due   Topic Date Due     PREVENTIVE CARE VISIT  Never done     ADVANCE CARE PLANNING  Never done     MAMMO SCREENING  Never done     COLORECTAL CANCER SCREENING  Never done     HIV SCREENING  Never done     COVID-19 Vaccine (1) Never done     HEPATITIS C SCREENING  Never done     DTAP/TDAP/TD IMMUNIZATION (1 - Tdap) Never done     LIPID  Never done     ZOSTER IMMUNIZATION (2 of 3) 12/22/2017     PHQ-2  Never done       Last Pap Smear: See above    Last Mammogram: birads 2; 2020     Last Colonoscopy: none      Current Medications:     has a current medication list which includes the following prescription(s): amlodipine,  "atorvastatin, lisinopril-hydrochlorothiazide, and oxycodone.       Allergies:      No Known Allergies       Social History:     Social History     Tobacco Use     Smoking status: Never Smoker     Smokeless tobacco: Never Used   Substance Use Topics     Alcohol use: Yes     Alcohol/week: 7.0 standard drinks     Types: 7 Glasses of wine per week     Frequency: 4 or more times a week     Drinks per session: 1 or 2     Comment: 1 glass per day       History   Drug Use Unknown           Family History:     The patient's family history is notable for no pberast, colon, ovary, uterine CA.      Physical Exam:   PS 0  VS: BP (!) 144/72 (BP Location: Right arm, Patient Position: Sitting, Cuff Size: Adult Regular)   Pulse 89   Temp 97.9  F (36.6  C) (Tympanic)   Resp 16   Ht 1.6 m (5' 3\")   Wt 79 kg (174 lb 3.2 oz)   SpO2 99%   BMI 30.86 kg/m     General Appearance: healthy and alert, no distress     HEENT:  no thyromegaly, no palpable nodules or masses        Cardiovascular: regular rate and rhythm, no gallops, rubs or murmurs     Respiratory: lungs clear, no rales, rhonchi or wheezes, normal diaphragmatic excursion    Musculoskeletal: extremities non tender and without edema    Skin: no lesions or rashes     Neurological: normal gait, no gross defects     Psychiatric: appropriate mood and affect                 Gastrointestinal:       abdomen soft, non-tender, non-distended, no organomegaly or masses, Incisions well helaling      Genitourinary:  There are no overt malignant appearing lesions. There were a small collection of cystic masses on the right vaginal wall (10 mmx3 mmx1 mm deep).  I did not remember these and therefore , after getting verbal consent - these were biopsied sharply.     PROCEDURE:  After obtaining informed consent the area was prepped with iodine and infiltrated with 1% lidocaine.  A biopsy was performed sterily using a Tischler.  The specimen was placed in formalin and labeled.  The defect was " hemostatic.  Wound care instructions discussed and a pad was given to prevent staining.        Assessment:    Dee Carlos is a 65 year old woman with a new diagnosis of EMCA.     A total of 30 minutes was spent with the patient, 20 minutes of which were spent in counseling the patient and/or treatment planning.      Plan:     1.)  Stage IA G2/serous EMCA - DAMION on clinical examination.  We have again discussed the options. She is aware that the standard of care in a stage one or mixed histology case is entirely unclear.  I had previously recommended chemotherapy based on a relatively higher risk of recurrence for serous (the minority component) cancer; but we discussed the entirety of the NCCN guideline recommendations through observation and she elected observation which was reasonable. She understands that recurrence most frequently occurs at the vaginal duff, but could occur anywhere and be more challenging to treat in that setting.  I have left open the opportunity for her to change her mind to a more aggressive approach, but believe she is making an informed decision.  She clearly understands the importance of follow-up especially in the absence of adjuvant treatment.  We have discussed findings, including the finding of her2 positivity.       2.) Genetic risk factors were assessed and the patient does not meet the qualifications for a referral.      3.) Labs and/or tests ordered include:  none     4.) Health maintenance issues addressed today include none.    Thank you for allowing us to participate in the care of your patient.         Sincerely,    John Proctor MD      CC  Patient Care Team:  Tiesha Tao MD as PCP - General  Esthela, John Marroquin MD as Assigned Cancer Care Provider  BRANDY LUCAS

## 2021-05-10 ENCOUNTER — ONCOLOGY VISIT (OUTPATIENT)
Dept: ONCOLOGY | Facility: CLINIC | Age: 66
End: 2021-05-10
Attending: OBSTETRICS & GYNECOLOGY
Payer: COMMERCIAL

## 2021-05-10 VITALS
RESPIRATION RATE: 16 BRPM | SYSTOLIC BLOOD PRESSURE: 144 MMHG | DIASTOLIC BLOOD PRESSURE: 72 MMHG | TEMPERATURE: 97.9 F | OXYGEN SATURATION: 99 % | HEIGHT: 63 IN | WEIGHT: 174.2 LBS | HEART RATE: 89 BPM | BODY MASS INDEX: 30.87 KG/M2

## 2021-05-10 DIAGNOSIS — C54.1 ENDOMETRIAL CANCER (H): ICD-10-CM

## 2021-05-10 LAB — CANCER AG125 SERPL-ACNC: 9 U/ML (ref 0–30)

## 2021-05-10 PROCEDURE — 88305 TISSUE EXAM BY PATHOLOGIST: CPT | Mod: 26 | Performed by: PATHOLOGY

## 2021-05-10 PROCEDURE — 88305 TISSUE EXAM BY PATHOLOGIST: CPT | Mod: TC | Performed by: OBSTETRICS & GYNECOLOGY

## 2021-05-10 PROCEDURE — G0463 HOSPITAL OUTPT CLINIC VISIT: HCPCS

## 2021-05-10 PROCEDURE — 99214 OFFICE O/P EST MOD 30 MIN: CPT | Mod: 24 | Performed by: OBSTETRICS & GYNECOLOGY

## 2021-05-10 PROCEDURE — 36415 COLL VENOUS BLD VENIPUNCTURE: CPT | Performed by: PATHOLOGY

## 2021-05-10 PROCEDURE — 86304 IMMUNOASSAY TUMOR CA 125: CPT | Mod: 90 | Performed by: PATHOLOGY

## 2021-05-10 PROCEDURE — 57100 BIOPSY VAGINAL MUCOSA SIMPLE: CPT | Performed by: OBSTETRICS & GYNECOLOGY

## 2021-05-10 ASSESSMENT — MIFFLIN-ST. JEOR: SCORE: 1314.3

## 2021-05-10 ASSESSMENT — PAIN SCALES - GENERAL: PAINLEVEL: NO PAIN (0)

## 2021-05-10 NOTE — NURSING NOTE
"Oncology Rooming Note    May 10, 2021 7:56 AM   Dee Carlos is a 65 year old female who presents for:    Chief Complaint   Patient presents with     Oncology Clinic Visit     Gyn onc- Endometrial cancer     Initial Vitals: BP (!) 144/72 (BP Location: Right arm, Patient Position: Sitting, Cuff Size: Adult Regular)   Pulse 89   Temp 97.9  F (36.6  C) (Tympanic)   Resp 16   Ht 1.6 m (5' 3\")   Wt 79 kg (174 lb 3.2 oz)   SpO2 99%   BMI 30.86 kg/m   Estimated body mass index is 30.86 kg/m  as calculated from the following:    Height as of this encounter: 1.6 m (5' 3\").    Weight as of this encounter: 79 kg (174 lb 3.2 oz). Body surface area is 1.87 meters squared.  No Pain (0) Comment: Data Unavailable   No LMP recorded. Patient is postmenopausal.  Allergies reviewed: Yes  Medications reviewed: Yes    Medications: Medication refills not needed today.  Pharmacy name entered into [facility] - your brands: [facility], [place] - [address]    Clinical concerns: N/A       Nayana Walker CMA              "

## 2021-05-10 NOTE — LETTER
5/10/2021         RE: Dee Carlos  [address]  [place] [address]        Dear Colleague,    Thank you for referring your patient, Dee Carlos, to the [facility]. Please see a copy of my visit note below.                            Consult Notes on Referred Patient    Dr. Evy Morton MD  [facility]  [address] [facility] [place] [address]       RE: Dee Carlos  : 1955  IAN: 5/10/2021     Dear Dr. Evy Morton:    I had the pleasure of seeing your patient Dee Carlos here at the Gynecologic Cancer Clinic at the [facility] on 2020.  As you know she is a very pleasant 65 year old woman with a recent diagnosis of endometrial cancer.  Given these findings she was subsequently sent to the Gynecologic Cancer Clinic for new patient consultation.     HPI  She was recently evaluated by Dr. Morton for PMB.  This evaluation resulted in an US which demonstrated fibroids and a thickened EMS.  A pap and biopsy were obtained (HPV negative, JULIET cells).  Biopsy demonstarted grade 1 EMCA    21 L/S staging    PATH:  FINAL DIAGNOSIS:   A. SENTINEL LYMPH NODE, RIGHT PELVIC #1, EXCISION:   - One reactive lymph node, negative for malignancy (0/1)     B. SENTINEL LYMPH NODE, RIGHT PELVIC #2, EXCISION:   - Two reactive lymph nodes, negative for malignancy (0/2)     C. SENTINEL LYMPH NODE, LEFT PELVIC 1 AND 2, LYMPHADENECTOMY:   - Two reactive lymph nodes, negative for malignancy (0/2)     D. UTERUS, BILATERAL FALLOPIAN TUBES AND OVARIES, TOTAL LAPAROSCOPIC   HYSTERECTOMY WITH BILATERAL   SALPINGO-OOPHORECTOMY:   - Endometrial adenocarcinoma, mixed cell type, endometrioid, FIGO grade 2   and serous carcinoma   - Leiomyomas with degenerative changes and focal calcification   - Cervix with squamous metaplasia and Nabothian cysts   - Left ovary with cortical inclusion cysts   - Bilateral ovaries with atrophic changes   - Bilateral  fallopian tubes with no significant histologic abnormalities     Synoptic Report:       Histologic Type:         - Mixed cell carcinoma (types and percentages) - Endometrioid (FIGO   grade         2, approximately 60%) and serous (approximately 40%)     Tumor Size: 4.0 x 3.5 x 0.6 Centimeters (cm)     Myometrial Invasion:         - Present       Depth of Myometrial Invasion (Millimeters): 1 mm       Myometrial Thickness (Millimeters): 21 mm       Percentage of Myometrial Invasion: 5%     Adenomyosis:         - Not identified     Uterine Serosa Involvement:         - Not identified     Lower Uterine Segment Involvement:         - Not identified     Cervical Stromal Involvement:         - Not identified     Other Tissue / Organ Involvement:         - Not identified     Peritoneal Ascitic Fluid:         - Negative for malignancy (normal / benign)     Lymphovascular Invasion:         - Not identified     LYMPH NODES     Lymph Node Status:         - All lymph nodes negative for tumor cells     Total Number of Pelvic Nodes Examined:         5     Number of Pelvic [place] Nodes Examined:         5     Total Number of Para-aortic Nodes Examined:         0     Number of Para-aortic [place] Nodes Examined:         0     PATHOLOGIC STAGE CLASSIFICATION (PTNM, AJCC 8TH EDITION)     Primary Tumor (pT):         - pT1a     Regional Lymph Nodes (pN):         - pN0     FIGO STAGE     FIGO Stage:         - IA        TEST(S) PERFORMED     MLH1:         - Intact nuclear expression     MSH2:         - Intact nuclear expression     MSH6:         - Intact nuclear expression     PMS2:         - Intact nuclear expression     Immunohistochemistry (IHC) Interpretation for Mismatch Repair (MMR)     Proteins:         - No loss of nuclear expression of MMR proteins: low probability of         microsatellite instability-high (MSI-H)       Microsatellite Instability (MSI):         - MSI-Stable (JENNY)       Review of Systems:    Systemic            no weight changes; no fever; no chills; no night sweats; no appetite changes  Skin           no rashes, or lesions  Eye           no irritation; no changes in vision  Heriberto-Laryngeal           no dysphagia; no hoarseness   Pulmonary    no cough; no shortness of breath  Cardiovascular    no chest pain; no palpitations  Gastrointestinal    no diarrhea; no constipation; no abdominal pain; no changes in bowel  habits; no blood in stool  Genitourinary   no urinary frequency; no urinary urgency; no dysuria; no pain; no abnormal vaginal discharge; no abnormal vaginal bleeding  Breast   no breast discharge; no breast changes; no breast pain  Musculoskeletal    no myalgias; no arthralgias; no back pain  Psychiatric           no depressed mood; no anxiety    Hematologic           no tender lymph nodes; no noticeable swellings or lumps   Endocrine    no hot flashes; no heat/cold intolerance         Neurological   no tremor; no numbness and tingling; no headaches; no difficulty  sleeping      Past Medical History:    No past medical history on file.      Past Surgical History:    Past Surgical History:   Procedure Laterality Date     CYSTOSCOPY N/A 1/12/2021    Procedure: CYSTOSCOPY  Latex Free;  Surgeon: John Proctor MD;  Location: [facility] OR     LAPAROSCOPIC HYSTERECTOMY TOTAL, BILATERAL SALPINGO-OOPHORECTOMY, NODE DISSECTION, COMBINED N/A 1/12/2021    Procedure: HYSTERECTOMY, TOTAL, LAPAROSCOPIC, WITH SALPINGO-OOPHORECTOMY AND LYMPHADENECTOMY;  Surgeon: John rPoctor MD;  Location:  OR         Health Maintenance:  Health Maintenance Due   Topic Date Due     PREVENTIVE CARE VISIT  Never done     ADVANCE CARE PLANNING  Never done     MAMMO SCREENING  Never done     COLORECTAL CANCER SCREENING  Never done     HIV SCREENING  Never done     COVID-19 Vaccine (1) Never done     HEPATITIS C SCREENING  Never done     DTAP/TDAP/TD IMMUNIZATION (1 - Tdap) Never done     LIPID  Never done     ZOSTER IMMUNIZATION (2  "of 3) 12/22/2017     PHQ-2  Never done       Last Pap Smear: See above    Last Mammogram: birads 2; 2020     Last Colonoscopy: none      Current Medications:     has a current medication list which includes the following prescription(s): amlodipine, atorvastatin, lisinopril-hydrochlorothiazide, and oxycodone.       Allergies:      No Known Allergies       Social History:     Social History     Tobacco Use     Smoking status: Never Smoker     Smokeless tobacco: Never Used   Substance Use Topics     Alcohol use: Yes     Alcohol/week: 7.0 standard drinks     Types: 7 Glasses of wine per week     Frequency: 4 or more times a week     Drinks per session: 1 or 2     Comment: 1 glass per day       History   Drug Use Unknown           Family History:     The patient's family history is notable for no pberast, colon, ovary, uterine CA.      Physical Exam:   PS 0  VS: BP (!) 144/72 (BP Location: Right arm, Patient Position: Sitting, Cuff Size: Adult Regular)   Pulse 89   Temp 97.9  F (36.6  C) (Tympanic)   Resp 16   Ht 1.6 m (5' 3\")   Wt 79 kg (174 lb 3.2 oz)   SpO2 99%   BMI 30.86 kg/m     General Appearance: healthy and alert, no distress     HEENT:  no thyromegaly, no palpable nodules or masses        Cardiovascular: regular rate and rhythm, no gallops, rubs or murmurs     Respiratory: lungs clear, no rales, rhonchi or wheezes, normal diaphragmatic excursion    Musculoskeletal: extremities non tender and without edema    Skin: no lesions or rashes     Neurological: normal gait, no gross defects     Psychiatric: appropriate mood and affect                 Gastrointestinal:       abdomen soft, non-tender, non-distended, no organomegaly or masses, Incisions well helaling      Genitourinary:  There are no overt malignant appearing lesions. There were a small collection of cystic masses on the right vaginal wall (10 mmx3 mmx1 mm deep).  I did not remember these and therefore , after getting verbal consent - these were " biopsied sharply.     PROCEDURE:  After obtaining informed consent the area was prepped with iodine and infiltrated with 1% lidocaine.  A biopsy was performed sterily using a Tischler.  The specimen was placed in formalin and labeled.  The defect was hemostatic.  Wound care instructions discussed and a pad was given to prevent staining.        Assessment:    Dee Carlos is a 65 year old woman with a new diagnosis of EMCA.     A total of 30 minutes was spent with the patient, 20 minutes of which were spent in counseling the patient and/or treatment planning.      Plan:     1.)  Stage IA G2/serous EMCA - DAMION on clinical examination.  We have again discussed the options. She is aware that the standard of care in a stage one or mixed histology case is entirely unclear.  I had previously recommended chemotherapy based on a relatively higher risk of recurrence for serous (the minority component) cancer; but we discussed the entirety of the NCCN guideline recommendations through observation and she elected observation which was reasonable. She understands that recurrence most frequently occurs at the vaginal duff, but could occur anywhere and be more challenging to treat in that setting.  I have left open the opportunity for her to change her mind to a more aggressive approach, but believe she is making an informed decision.  She clearly understands the importance of follow-up especially in the absence of adjuvant treatment.  We have discussed findings, including the finding of her2 positivity.       2.) Genetic risk factors were assessed and the patient does not meet the qualifications for a referral.      3.) Labs and/or tests ordered include:  none     4.) Health maintenance issues addressed today include none.    Thank you for allowing us to participate in the care of your patient.         Sincerely,    John Proctor MD      CC  Patient Care Team:  Tiesha Tao MD as PCP - General  BRANDY LUCAS

## 2021-05-11 LAB — COPATH REPORT: NORMAL

## 2021-08-23 ENCOUNTER — LAB (OUTPATIENT)
Dept: LAB | Facility: CLINIC | Age: 66
End: 2021-08-23
Payer: COMMERCIAL

## 2021-08-23 ENCOUNTER — ONCOLOGY VISIT (OUTPATIENT)
Dept: ONCOLOGY | Facility: CLINIC | Age: 66
End: 2021-08-23
Attending: OBSTETRICS & GYNECOLOGY
Payer: COMMERCIAL

## 2021-08-23 VITALS
HEART RATE: 74 BPM | TEMPERATURE: 99.5 F | RESPIRATION RATE: 16 BRPM | OXYGEN SATURATION: 99 % | HEIGHT: 63 IN | BODY MASS INDEX: 29.93 KG/M2 | SYSTOLIC BLOOD PRESSURE: 152 MMHG | DIASTOLIC BLOOD PRESSURE: 91 MMHG | WEIGHT: 168.9 LBS

## 2021-08-23 DIAGNOSIS — C54.1 ENDOMETRIAL CANCER (H): ICD-10-CM

## 2021-08-23 LAB — CANCER AG125 SERPL-ACNC: 8 U/ML (ref 0–30)

## 2021-08-23 PROCEDURE — 99214 OFFICE O/P EST MOD 30 MIN: CPT | Performed by: OBSTETRICS & GYNECOLOGY

## 2021-08-23 PROCEDURE — 999N000104 HC STATISTIC NO CHARGE

## 2021-08-23 PROCEDURE — 36415 COLL VENOUS BLD VENIPUNCTURE: CPT | Performed by: PATHOLOGY

## 2021-08-23 PROCEDURE — 86304 IMMUNOASSAY TUMOR CA 125: CPT | Mod: 90 | Performed by: PATHOLOGY

## 2021-08-23 ASSESSMENT — MIFFLIN-ST. JEOR: SCORE: 1285.13

## 2021-08-23 ASSESSMENT — PAIN SCALES - GENERAL: PAINLEVEL: NO PAIN (0)

## 2021-08-23 NOTE — PROGRESS NOTES
Consult Notes on Referred Patient    Dr. Evy Morton MD  [facility]  [id number] [place] [address]       RE: Dee Carlos  : 1955  IAN: 5/10/2021     Dear Dr. Evy Morton:    I had the pleasure of seeing your patient Dee Carlos here at the Gynecologic Cancer Clinic at the [facility] on 2020.  As you know she is a very pleasant 65 year old woman with a recent diagnosis of endometrial cancer.  Given these findings she was subsequently sent to the Gynecologic Cancer Clinic for new patient consultation.     HPI  She was recently evaluated by Dr. Morton for PMB.  This evaluation resulted in an US which demonstrated fibroids and a thickened EMS.  A pap and biopsy were obtained (HPV negative, JULIET cells).  Biopsy demonstarted grade 1 EMCA    21 L/S staging    PATH:  FINAL DIAGNOSIS:   A. SENTINEL LYMPH NODE, RIGHT PELVIC #1, EXCISION:   - One reactive lymph node, negative for malignancy (0/1)     B. SENTINEL LYMPH NODE, RIGHT PELVIC #2, EXCISION:   - Two reactive lymph nodes, negative for malignancy (0/2)     C. SENTINEL LYMPH NODE, LEFT PELVIC 1 AND 2, LYMPHADENECTOMY:   - Two reactive lymph nodes, negative for malignancy (0/2)     D. UTERUS, BILATERAL FALLOPIAN TUBES AND OVARIES, TOTAL LAPAROSCOPIC   HYSTERECTOMY WITH BILATERAL   SALPINGO-OOPHORECTOMY:   - Endometrial adenocarcinoma, mixed cell type, endometrioid, FIGO grade 2   and serous carcinoma   - Leiomyomas with degenerative changes and focal calcification   - Cervix with squamous metaplasia and Nabothian cysts   - Left ovary with cortical inclusion cysts   - Bilateral ovaries with atrophic changes   - Bilateral fallopian tubes with no significant histologic abnormalities     Synoptic Report:       Histologic Type:         - Mixed cell carcinoma (types and percentages) - Endometrioid (FIGO   grade         2, approximately 60%) and serous (approximately 40%)     Tumor  Size: 4.0 x 3.5 x 0.6 Centimeters (cm)     Myometrial Invasion:         - Present       Depth of Myometrial Invasion (Millimeters): 1 mm       Myometrial Thickness (Millimeters): 21 mm       Percentage of Myometrial Invasion: 5%     Adenomyosis:         - Not identified     Uterine Serosa Involvement:         - Not identified     Lower Uterine Segment Involvement:         - Not identified     Cervical Stromal Involvement:         - Not identified     Other Tissue / Organ Involvement:         - Not identified     Peritoneal Ascitic Fluid:         - Negative for malignancy (normal / benign)     Lymphovascular Invasion:         - Not identified     LYMPH NODES     Lymph Node Status:         - All lymph nodes negative for tumor cells     Total Number of Pelvic Nodes Examined:         5     Number of Pelvic [place] Nodes Examined:         5     Total Number of Para-aortic Nodes Examined:         0     Number of Para-aortic [place] Nodes Examined:         0     PATHOLOGIC STAGE CLASSIFICATION (PTNM, AJCC 8TH EDITION)     Primary Tumor (pT):         - pT1a     Regional Lymph Nodes (pN):         - pN0     FIGO STAGE     FIGO Stage:         - IA        TEST(S) PERFORMED     MLH1:         - Intact nuclear expression     MSH2:         - Intact nuclear expression     MSH6:         - Intact nuclear expression     PMS2:         - Intact nuclear expression     Immunohistochemistry (IHC) Interpretation for Mismatch Repair (MMR)     Proteins:         - No loss of nuclear expression of MMR proteins: low probability of         microsatellite instability-high (MSI-H)       Microsatellite Instability (MSI):         - MSI-Stable (JENNY)       She declined adjuvant therapy at that time.      5/2021     : 9 Exam with small vaginal cyst (biopsied):  PATH:  VAGINA, BIOPSY:   - Vaginal mucosa with a benign retention cyst   - Negative for malignancy    Review of Systems:    Systemic           no weight changes; no fever; no chills; no  night sweats; no appetite changes  Skin           no rashes, or lesions  Eye           no irritation; no changes in vision  Heriberto-Laryngeal           no dysphagia; no hoarseness   Pulmonary    no cough; no shortness of breath  Cardiovascular    no chest pain; no palpitations  Gastrointestinal    no diarrhea; no constipation; no abdominal pain; no changes in bowel  habits; no blood in stool  Genitourinary   no urinary frequency; no urinary urgency; no dysuria; no pain; no abnormal vaginal discharge; no abnormal vaginal bleeding  Breast   no breast discharge; no breast changes; no breast pain  Musculoskeletal    no myalgias; no arthralgias; no back pain  Psychiatric           no depressed mood; no anxiety    Hematologic           no tender lymph nodes; no noticeable swellings or lumps   Endocrine    no hot flashes; no heat/cold intolerance         Neurological   no tremor; no numbness and tingling; no headaches; no difficulty  sleeping      Past Medical History:    Endometrial cancer - mixed serous/endometrioid    Past Surgical History:    Past Surgical History:   Procedure Laterality Date     CYSTOSCOPY N/A 1/12/2021    Procedure: CYSTOSCOPY  Latex Free;  Surgeon: John Proctor MD;  Location: [facility] OR     LAPAROSCOPIC HYSTERECTOMY TOTAL, BILATERAL SALPINGO-OOPHORECTOMY, NODE DISSECTION, COMBINED N/A 1/12/2021    Procedure: HYSTERECTOMY, TOTAL, LAPAROSCOPIC, WITH SALPINGO-OOPHORECTOMY AND LYMPHADENECTOMY;  Surgeon: John Proctor MD;  Location:  OR         Health Maintenance:  Health Maintenance Due   Topic Date Due     PREVENTIVE CARE VISIT  Never done     ADVANCE CARE PLANNING  Never done     MAMMO SCREENING  Never done     HIV SCREENING  Never done     HEPATITIS C SCREENING  Never done     DTAP/TDAP/TD IMMUNIZATION (1 - Tdap) 04/24/1997     LIPID  Never done     ZOSTER IMMUNIZATION (2 of 3) 12/22/2017     PHQ-2  Never done       Last Pap Smear: See above    Last Mammogram: birads 2; 2020  "    Last Colonoscopy: none      Current Medications:     has a current medication list which includes the following prescription(s): amlodipine, atorvastatin, lisinopril-hydrochlorothiazide, and oxycodone.       Allergies:      No Known Allergies       Social History:     Social History     Tobacco Use     Smoking status: Never Smoker     Smokeless tobacco: Never Used   Substance Use Topics     Alcohol use: Yes     Alcohol/week: 7.0 standard drinks     Types: 7 Glasses of wine per week     Comment: 1 glass per day       History   Drug Use Unknown           Family History:     The patient's family history is notable for no pberast, colon, ovary, uterine CA.      Physical Exam:   PS 0  VS: BP (!) 152/91 (BP Location: Right arm, Patient Position: Chair, Cuff Size: Adult Regular)   Pulse 74   Temp 99.5  F (37.5  C) (Oral)   Resp 16   Ht 1.6 m (5' 2.99\")   Wt 76.6 kg (168 lb 14.4 oz)   SpO2 99%   BMI 29.93 kg/m       General Appearance: healthy and alert, no distress     HEENT:  no thyromegaly, no palpable nodules or masses        Cardiovascular: regular rate and rhythm, no gallops, rubs or murmurs     Respiratory: lungs clear, no rales, rhonchi or wheezes, normal diaphragmatic excursion    Musculoskeletal: extremities non tender and without edema    Skin: no lesions or rashes     Neurological: normal gait, no gross defects     Psychiatric: appropriate mood and affect                 Gastrointestinal:       abdomen soft, non-tender, non-distended, no organomegaly or masses, Incisions well helaling      Genitourinary:  There are no overt malignant appearing lesions. There are no additional lesions are erosions.  The rectal examination in unremarkable.      Assessment:    Dee Carlos is a woman with a new diagnosis of EMCA.     A total of 30 minutes was spent with the patient, 20 minutes of which were spent in counseling the patient and/or treatment planning.      Plan:     1.)  Stage IA G2/serous EMCA - DAMION on " clinical examination.  We have again discussed the options. She is aware that the standard of care in a stage one or mixed histology case is entirely unclear.  I had previously recommended chemotherapy based on a relatively higher risk of recurrence for serous (the minority component) cancer; but we discussed the entirety of the NCCN guideline recommendations through observation and she elected observation which was reasonable. She understands that recurrence most frequently occurs at the vaginal duff, but could occur anywhere and be more challenging to treat in that setting.  I have left open the opportunity for her to change her mind to a more aggressive approach, but believe she is making an informed decision.  She clearly understands the importance of follow-up especially in the absence of adjuvant treatment.  We have discussed findings, including the finding of her2 positivity.      Will follow q3 months for now.     2.) Genetic risk factors were assessed and the patient does not meet the qualifications for a referral.      3.) Labs and/or tests ordered include:  CA-125     4.) Health maintenance issues addressed today include none.    Thank you for allowing us to participate in the care of your patient.         Sincerely,    John Proctor MD      CC  Patient Care Team:  Tiesha Tao MD as PCP - General  Esthela, John Marroquin MD as Assigned Cancer Care Provider  BRANDY LUCAS

## 2021-08-23 NOTE — LETTER
2021         RE: Dee Carlos  [address]  [place] [address]        Dear Colleague,    Thank you for referring your patient, Dee Carlos, to the [facility]. Please see a copy of my visit note below.                            Consult Notes on Referred Patient    Dr. Evy Morton MD  [facility]  [id number] [place] [address]       RE: Dee Carlos  : 1955  IAN: 5/10/2021     Dear Dr. Evy Morton:    I had the pleasure of seeing your patient Dee Carlos here at the Gynecologic Cancer Clinic at the [facility] on 2020.  As you know she is a very pleasant 65 year old woman with a recent diagnosis of endometrial cancer.  Given these findings she was subsequently sent to the Gynecologic Cancer Clinic for new patient consultation.     HPI  She was recently evaluated by Dr. Morton for PMB.  This evaluation resulted in an US which demonstrated fibroids and a thickened EMS.  A pap and biopsy were obtained (HPV negative, JULIET cells).  Biopsy demonstarted grade 1 EMCA    21 L/S staging    PATH:  FINAL DIAGNOSIS:   A. SENTINEL LYMPH NODE, RIGHT PELVIC #1, EXCISION:   - One reactive lymph node, negative for malignancy (0/1)     B. SENTINEL LYMPH NODE, RIGHT PELVIC #2, EXCISION:   - Two reactive lymph nodes, negative for malignancy (0/2)     C. SENTINEL LYMPH NODE, LEFT PELVIC 1 AND 2, LYMPHADENECTOMY:   - Two reactive lymph nodes, negative for malignancy (0/2)     D. UTERUS, BILATERAL FALLOPIAN TUBES AND OVARIES, TOTAL LAPAROSCOPIC   HYSTERECTOMY WITH BILATERAL   SALPINGO-OOPHORECTOMY:   - Endometrial adenocarcinoma, mixed cell type, endometrioid, FIGO grade 2   and serous carcinoma   - Leiomyomas with degenerative changes and focal calcification   - Cervix with squamous metaplasia and Nabothian cysts   - Left ovary with cortical inclusion cysts   - Bilateral ovaries with atrophic changes   - Bilateral  fallopian tubes with no significant histologic abnormalities     Synoptic Report:       Histologic Type:         - Mixed cell carcinoma (types and percentages) - Endometrioid (FIGO   grade         2, approximately 60%) and serous (approximately 40%)     Tumor Size: 4.0 x 3.5 x 0.6 Centimeters (cm)     Myometrial Invasion:         - Present       Depth of Myometrial Invasion (Millimeters): 1 mm       Myometrial Thickness (Millimeters): 21 mm       Percentage of Myometrial Invasion: 5%     Adenomyosis:         - Not identified     Uterine Serosa Involvement:         - Not identified     Lower Uterine Segment Involvement:         - Not identified     Cervical Stromal Involvement:         - Not identified     Other Tissue / Organ Involvement:         - Not identified     Peritoneal Ascitic Fluid:         - Negative for malignancy (normal / benign)     Lymphovascular Invasion:         - Not identified     LYMPH NODES     Lymph Node Status:         - All lymph nodes negative for tumor cells     Total Number of Pelvic Nodes Examined:         5     Number of Pelvic [place] Nodes Examined:         5     Total Number of Para-aortic Nodes Examined:         0     Number of Para-aortic [place] Nodes Examined:         0     PATHOLOGIC STAGE CLASSIFICATION (PTNM, AJCC 8TH EDITION)     Primary Tumor (pT):         - pT1a     Regional Lymph Nodes (pN):         - pN0     FIGO STAGE     FIGO Stage:         - IA        TEST(S) PERFORMED     MLH1:         - Intact nuclear expression     MSH2:         - Intact nuclear expression     MSH6:         - Intact nuclear expression     PMS2:         - Intact nuclear expression     Immunohistochemistry (IHC) Interpretation for Mismatch Repair (MMR)     Proteins:         - No loss of nuclear expression of MMR proteins: low probability of         microsatellite instability-high (MSI-H)       Microsatellite Instability (MSI):         - MSI-Stable (JENNY)       She declined adjuvant therapy at that  time.      5/2021     : 9 Exam with small vaginal cyst (biopsied):  PATH:  VAGINA, BIOPSY:   - Vaginal mucosa with a benign retention cyst   - Negative for malignancy    Review of Systems:    Systemic           no weight changes; no fever; no chills; no night sweats; no appetite changes  Skin           no rashes, or lesions  Eye           no irritation; no changes in vision  Heriberto-Laryngeal           no dysphagia; no hoarseness   Pulmonary    no cough; no shortness of breath  Cardiovascular    no chest pain; no palpitations  Gastrointestinal    no diarrhea; no constipation; no abdominal pain; no changes in bowel  habits; no blood in stool  Genitourinary   no urinary frequency; no urinary urgency; no dysuria; no pain; no abnormal vaginal discharge; no abnormal vaginal bleeding  Breast   no breast discharge; no breast changes; no breast pain  Musculoskeletal    no myalgias; no arthralgias; no back pain  Psychiatric           no depressed mood; no anxiety    Hematologic           no tender lymph nodes; no noticeable swellings or lumps   Endocrine    no hot flashes; no heat/cold intolerance         Neurological   no tremor; no numbness and tingling; no headaches; no difficulty  sleeping      Past Medical History:    Endometrial cancer - mixed serous/endometrioid    Past Surgical History:    Past Surgical History:   Procedure Laterality Date     CYSTOSCOPY N/A 1/12/2021    Procedure: CYSTOSCOPY  Latex Free;  Surgeon: John Proctor MD;  Location: [facility] OR     LAPAROSCOPIC HYSTERECTOMY TOTAL, BILATERAL SALPINGO-OOPHORECTOMY, NODE DISSECTION, COMBINED N/A 1/12/2021    Procedure: HYSTERECTOMY, TOTAL, LAPAROSCOPIC, WITH SALPINGO-OOPHORECTOMY AND LYMPHADENECTOMY;  Surgeon: John Proctor MD;  Location:  OR         Health Maintenance:  Health Maintenance Due   Topic Date Due     PREVENTIVE CARE VISIT  Never done     ADVANCE CARE PLANNING  Never done     MAMMO SCREENING  Never done     HIV SCREENING   "Never done     HEPATITIS C SCREENING  Never done     DTAP/TDAP/TD IMMUNIZATION (1 - Tdap) 04/24/1997     LIPID  Never done     ZOSTER IMMUNIZATION (2 of 3) 12/22/2017     PHQ-2  Never done       Last Pap Smear: See above    Last Mammogram: birads 2; 2020     Last Colonoscopy: none      Current Medications:     has a current medication list which includes the following prescription(s): amlodipine, atorvastatin, lisinopril-hydrochlorothiazide, and oxycodone.       Allergies:      No Known Allergies       Social History:     Social History     Tobacco Use     Smoking status: Never Smoker     Smokeless tobacco: Never Used   Substance Use Topics     Alcohol use: Yes     Alcohol/week: 7.0 standard drinks     Types: 7 Glasses of wine per week     Comment: 1 glass per day       History   Drug Use Unknown           Family History:     The patient's family history is notable for no pberast, colon, ovary, uterine CA.      Physical Exam:   PS 0  VS: BP (!) 152/91 (BP Location: Right arm, Patient Position: Chair, Cuff Size: Adult Regular)   Pulse 74   Temp 99.5  F (37.5  C) (Oral)   Resp 16   Ht 1.6 m (5' 2.99\")   Wt 76.6 kg (168 lb 14.4 oz)   SpO2 99%   BMI 29.93 kg/m       General Appearance: healthy and alert, no distress     HEENT:  no thyromegaly, no palpable nodules or masses        Cardiovascular: regular rate and rhythm, no gallops, rubs or murmurs     Respiratory: lungs clear, no rales, rhonchi or wheezes, normal diaphragmatic excursion    Musculoskeletal: extremities non tender and without edema    Skin: no lesions or rashes     Neurological: normal gait, no gross defects     Psychiatric: appropriate mood and affect                 Gastrointestinal:       abdomen soft, non-tender, non-distended, no organomegaly or masses, Incisions well helaling      Genitourinary:  There are no overt malignant appearing lesions. There are no additional lesions are erosions.  The rectal examination in " unremarkable.      Assessment:    Dee Carlos is a woman with a new diagnosis of EMCA.     A total of 30 minutes was spent with the patient, 20 minutes of which were spent in counseling the patient and/or treatment planning.      Plan:     1.)  Stage IA G2/serous EMCA - DAMION on clinical examination.  We have again discussed the options. She is aware that the standard of care in a stage one or mixed histology case is entirely unclear.  I had previously recommended chemotherapy based on a relatively higher risk of recurrence for serous (the minority component) cancer; but we discussed the entirety of the NCCN guideline recommendations through observation and she elected observation which was reasonable. She understands that recurrence most frequently occurs at the vaginal duff, but could occur anywhere and be more challenging to treat in that setting.  I have left open the opportunity for her to change her mind to a more aggressive approach, but believe she is making an informed decision.  She clearly understands the importance of follow-up especially in the absence of adjuvant treatment.  We have discussed findings, including the finding of her2 positivity.      Will follow q3 months for now.     2.) Genetic risk factors were assessed and the patient does not meet the qualifications for a referral.      3.) Labs and/or tests ordered include:  CA-125     4.) Health maintenance issues addressed today include none.    Thank you for allowing us to participate in the care of your patient.         Sincerely,    John Proctor MD      CC  Patient Care Team:  Tiesha Tao MD as PCP - General  BRANDY LUCAS

## 2021-10-11 ENCOUNTER — HEALTH MAINTENANCE LETTER (OUTPATIENT)
Age: 66
End: 2021-10-11

## 2021-12-10 NOTE — PROGRESS NOTES
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 She completed surgical treatment 2021 and declined any adjuvant therapy. She is here today for a surveillance visit.      Oncology history:  She was evaluated by Dr. Morton for PMB.  This evaluation resulted in an US which demonstrated fibroids and a thickened EMS.  A pap and biopsy were obtained (HPV negative, JULIET cells).  Biopsy demonstarted grade 1 EMCA     21: Total laparoscopic hysterectomy, bilateral salpingo-oophorectomy, bilateral sentinel lymph node dissection, cystoscopy     PATH:  FINAL DIAGNOSIS:   A. SENTINEL LYMPH NODE, RIGHT PELVIC #1, EXCISION:   - One reactive lymph node, negative for malignancy (0/1)     B. SENTINEL LYMPH NODE, RIGHT PELVIC #2, EXCISION:   - Two reactive lymph nodes, negative for malignancy (0/2)     C. SENTINEL LYMPH NODE, LEFT PELVIC 1 AND 2, LYMPHADENECTOMY:   - Two reactive lymph nodes, negative for malignancy (0/2)     D. UTERUS, BILATERAL FALLOPIAN TUBES AND OVARIES, TOTAL LAPAROSCOPIC HYSTERECTOMY WITH BILATERAL SALPINGO-OOPHORECTOMY:   - Endometrial adenocarcinoma, mixed cell type, endometrioid, FIGO grade 2 and serous carcinoma   - Leiomyomas with degenerative changes and focal calcification   - Cervix with squamous metaplasia and Nabothian cysts   - Left ovary with cortical inclusion cysts   - Bilateral ovaries with atrophic changes   - Bilateral fallopian tubes with no significant histologic abnormalities     Synoptic Report:       Histologic Type:         - Mixed cell carcinoma (types and percentages) - Endometrioid (FIGO grade 2, approximately 60%) and serous (approximately 40%)     Tumor Size: 4.0 x 3.5 x 0.6 Centimeters (cm)     Myometrial Invasion:         - Present       Depth of Myometrial Invasion (Millimeters): 1 mm        Myometrial Thickness (Millimeters): 21 mm       Percentage of Myometrial Invasion: 5%     Adenomyosis:         - Not identified     Uterine Serosa Involvement:         - Not identified     Lower Uterine Segment Involvement:         - Not identified     Cervical Stromal Involvement:         - Not identified     Other Tissue / Organ Involvement:         - Not identified     Peritoneal Ascitic Fluid:         - Negative for malignancy (normal / benign)     Lymphovascular Invasion:         - Not identified     LYMPH NODES     Lymph Node Status:         - All lymph nodes negative for tumor cells     Total Number of Pelvic Nodes Examined:         5     Number of Pelvic [place] Nodes Examined:         5     Total Number of Para-aortic Nodes Examined:         0     Number of Para-aortic [place] Nodes Examined:         0     PATHOLOGIC STAGE CLASSIFICATION (PTNM, AJCC 8TH EDITION)     Primary Tumor (pT):         - pT1a     Regional Lymph Nodes (pN):         - pN0     FIGO STAGE     FIGO Stage:         - IA        TEST(S) PERFORMED     MLH1:         - Intact nuclear expression     MSH2:         - Intact nuclear expression     MSH6:         - Intact nuclear expression     PMS2:         - Intact nuclear expression     Immunohistochemistry (IHC) Interpretation for Mismatch Repair (MMR)      Proteins:         - No loss of nuclear expression of MMR proteins: low probability of microsatellite instability-high (MSI-H)       Microsatellite Instability (MSI):         - MSI-Stable (JENNY)         She declined adjuvant therapy at that time.        5/10/2021:  9.  Exam with small vaginal cyst (biopsied):  VAGINA, BIOPSY:   - Vaginal mucosa with a benign retention cyst   - Negative for malignancy    12/13/21:  pending.         Today she comes to clinic feeling well and denies any concerns for her visit. She denies any vaginal bleeding, no changes in her bowel or bladder habits, no nausea/emesis, no lower extremity edema,  and no difficulties eating or sleeping. She denies any abdominal discomfort/bloating, no fevers or chills, and no chest pain or shortness of breath. She had knee replacement surgery in October and is recovering well from that. She is current with her annual exam; her mammogram will be due 2/2022, and she had her colon cancer screening 3/2021 (she is due again in 3 years). She is wondering if she can have her next visit closer to home ([place]). She does not monitor her BP at home; she states her PCP did adjust her HTN medications (Lisinopril 40 mg/day; hydrochlorothiazide 25 mg/day).         Review of Systems:    Systemic           no weight changes; no fever; no chills; no night sweats; no appetite changes  Skin           no rashes, or lesions  Eye           no irritation; no changes in vision  Heriberto-Laryngeal           no dysphagia; no hoarseness   Pulmonary    no cough; no shortness of breath  Cardiovascular    no chest pain; no palpitations; + HTN  Gastrointestinal    no diarrhea; no constipation; no abdominal pain; no changes in bowel habits; no blood in stool  Genitourinary   no urinary frequency; no urinary urgency; no dysuria; no pain; no abnormal vaginal discharge; no abnormal vaginal bleeding  Breast    no breast discharge; no breast changes; no breast pain  Musculoskeletal    no myalgias; no arthralgias; no back pain  Psychiatric           no depressed mood; no anxiety    Hematologic              no tender lymph nodes; no noticeable swellings or lumps   Endocrine    no hot flashes; no heat/cold intolerance         Neurological   no tremor; no numbness and tingling; no headaches; no difficulty sleeping      Past Medical History:    No past medical history on file.      Past Surgical History:    Past Surgical History:   Procedure Laterality Date     CYSTOSCOPY N/A 1/12/2021    Procedure: CYSTOSCOPY  Latex Free;  Surgeon: John Proctor MD;  Location: [facility] OR     LAPAROSCOPIC HYSTERECTOMY TOTAL,  BILATERAL SALPINGO-OOPHORECTOMY, NODE DISSECTION, COMBINED N/A 1/12/2021    Procedure: HYSTERECTOMY, TOTAL, LAPAROSCOPIC, WITH SALPINGO-OOPHORECTOMY AND LYMPHADENECTOMY;  Surgeon: John Proctor MD;  Location:  OR         Health Maintenance Due   Topic Date Due     PREVENTIVE CARE VISIT  Never done     ADVANCE CARE PLANNING  Never done     MAMMO SCREENING  Never done     HIV SCREENING  Never done     HEPATITIS C SCREENING  Never done     LIPID  Never done     PHQ-2  Never done     INFLUENZA VACCINE (1) 09/01/2021     COVID-19 Vaccine (3 - Booster for Pfizer series) 09/18/2021       Current Medications:     Current Outpatient Medications   Medication Sig Dispense Refill     amLODIPine (NORVASC) 10 MG tablet Take 10 mg by mouth daily       atorvastatin (LIPITOR) 40 MG tablet Take 40 mg by mouth daily       lisinopril-hydrochlorothiazide (ZESTORETIC) 20-25 MG tablet Take 1 tablet by mouth daily           Allergies:      No Known Allergies     Social History:     Social History     Tobacco Use     Smoking status: Never Smoker     Smokeless tobacco: Never Used   Substance Use Topics     Alcohol use: Yes     Alcohol/week: 7.0 standard drinks     Types: 7 Glasses of wine per week     Comment: 1 glass per day       History   Drug Use Unknown         Family History:     The patient's family history is notable for:    No family history on file.      Physical Exam:     /74   Pulse 88   Temp 98.6  F (37  C) (Oral)   Resp 16   Wt 79.1 kg (174 lb 6.4 oz)   SpO2 99%   BMI 30.90 kg/m    Body mass index is 30.9 kg/m .    General Appearance: healthy and alert, no distress     HEENT: no thyromegaly, no palpable nodules or masses        Cardiovascular: regular rate and rhythm, no gallops, rubs or murmurs     Respiratory: lungs clear, no rales, rhonchi or wheezes, normal diaphragmatic excursion    Musculoskeletal: extremities non tender and without edema    Skin: no lesions or rashes     Neurological: normal  gait, no gross defects     Psychiatric: appropriate mood and affect                               Hematological: normal cervical, supraclavicular and inguinal lymph nodes     Gastrointestinal:       abdomen soft, non-tender, non-distended, no organomegaly or masses    Genitourinary: External genitalia and urethral meatus appears normal.  Vagina is smooth without nodularity or masses.  Cervix surgically absent.  Bimanual exam reveal no masses, nodularity or fullness.  Recto-vaginal exam confirms these findings.      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 She completed surgical treatment 1/2021 and declined any adjuvant therapy. She is here today for a surveillance visit.      25 minutes spent on the date of the encounter doing chart review, history and exam, documentation and further activities as noted above      Plan:     1.)       Patient to RTC in 6 months for her next surveillance visit (per her request she would like to have her next visit in 3 months closer to home with an OBGyn). She will continue to have visits every 3 months until 1/2023 and then every 6 months x 3 additional years. Discussed that we will not follow a  with her low stage endometrial cancer diagnosis.  Reviewed recommendations from [facility] not to perform surveillance pap smears in women diagnosed with endometrial cancer as this does not improve detection of local recurrence. Reviewed signs and symptoms for when she should contact the clinic or seek additional care. Patient to contact the clinic with any questions or concerns in the interim.  Answered all of her questions to the best of my ability.     2.) Genetic risk factors were assessed and she has intact/normal MMR proteins. MMS    3.) Labs and/or tests ordered include: none.      4.) Health maintenance issues addressed today include annual health maintenance and non-gynecologic issues with  PCP.    5.)        Her Cancer Treatment Plan and Summary was reviewed, questions answered, and she was given a copy.     LINDSAY Torres, WHNP-BC, ANP-BC  Women's Health Nurse Practitioner  Adult Nurse Practitioner  Division of Gynecologic Oncology          CC  Patient Care Team:  Tiesha Tao MD as PCP - General Proctor, John Marroquin MD as Assigned Cancer Care Provider  SELF, REFERRED

## 2021-12-13 ENCOUNTER — APPOINTMENT (OUTPATIENT)
Dept: LAB | Facility: CLINIC | Age: 66
End: 2021-12-13
Attending: NURSE PRACTITIONER
Payer: COMMERCIAL

## 2021-12-13 ENCOUNTER — ONCOLOGY VISIT (OUTPATIENT)
Dept: ONCOLOGY | Facility: CLINIC | Age: 66
End: 2021-12-13
Attending: NURSE PRACTITIONER
Payer: COMMERCIAL

## 2021-12-13 VITALS
WEIGHT: 174.4 LBS | TEMPERATURE: 98.6 F | DIASTOLIC BLOOD PRESSURE: 74 MMHG | RESPIRATION RATE: 16 BRPM | SYSTOLIC BLOOD PRESSURE: 114 MMHG | BODY MASS INDEX: 30.9 KG/M2 | HEART RATE: 88 BPM | OXYGEN SATURATION: 99 %

## 2021-12-13 DIAGNOSIS — C54.1 ENDOMETRIAL CANCER (H): Primary | ICD-10-CM

## 2021-12-13 LAB — CANCER AG125 SERPL-ACNC: 6 U/ML (ref 0–30)

## 2021-12-13 PROCEDURE — 36415 COLL VENOUS BLD VENIPUNCTURE: CPT | Performed by: NURSE PRACTITIONER

## 2021-12-13 PROCEDURE — 86304 IMMUNOASSAY TUMOR CA 125: CPT | Performed by: NURSE PRACTITIONER

## 2021-12-13 PROCEDURE — 99213 OFFICE O/P EST LOW 20 MIN: CPT | Performed by: NURSE PRACTITIONER

## 2021-12-13 ASSESSMENT — PAIN SCALES - GENERAL: PAINLEVEL: NO PAIN (0)

## 2021-12-13 NOTE — LETTER
2021         RE: Dee Carlos  [address]  [place] [address]        Dear Colleague,    Thank you for referring your patient, Dee Carlos, to the [facility]. Please see a copy of my visit note below.                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 She completed surgical treatment 2021 and declined any adjuvant therapy. She is here today for a surveillance visit.      Oncology history:  She was evaluated by Dr. Morton for PMB.  This evaluation resulted in an US which demonstrated fibroids and a thickened EMS.  A pap and biopsy were obtained (HPV negative, JULIET cells).  Biopsy demonstarted grade 1 EMCA     21: Total laparoscopic hysterectomy, bilateral salpingo-oophorectomy, bilateral sentinel lymph node dissection, cystoscopy     PATH:  FINAL DIAGNOSIS:   A. SENTINEL LYMPH NODE, RIGHT PELVIC #1, EXCISION:   - One reactive lymph node, negative for malignancy (0/1)     B. SENTINEL LYMPH NODE, RIGHT PELVIC #2, EXCISION:   - Two reactive lymph nodes, negative for malignancy (0/2)     C. SENTINEL LYMPH NODE, LEFT PELVIC 1 AND 2, LYMPHADENECTOMY:   - Two reactive lymph nodes, negative for malignancy (0/2)     D. UTERUS, BILATERAL FALLOPIAN TUBES AND OVARIES, TOTAL LAPAROSCOPIC HYSTERECTOMY WITH BILATERAL SALPINGO-OOPHORECTOMY:   - Endometrial adenocarcinoma, mixed cell type, endometrioid, FIGO grade 2 and serous carcinoma   - Leiomyomas with degenerative changes and focal calcification   - Cervix with squamous metaplasia and Nabothian cysts   - Left ovary with cortical inclusion cysts   - Bilateral ovaries with atrophic changes   - Bilateral fallopian tubes with no significant histologic abnormalities     Synoptic Report:       Histologic Type:         - Mixed cell  carcinoma (types and percentages) - Endometrioid (FIGO grade 2, approximately 60%) and serous (approximately 40%)     Tumor Size: 4.0 x 3.5 x 0.6 Centimeters (cm)     Myometrial Invasion:         - Present       Depth of Myometrial Invasion (Millimeters): 1 mm       Myometrial Thickness (Millimeters): 21 mm       Percentage of Myometrial Invasion: 5%     Adenomyosis:         - Not identified     Uterine Serosa Involvement:         - Not identified     Lower Uterine Segment Involvement:         - Not identified     Cervical Stromal Involvement:         - Not identified     Other Tissue / Organ Involvement:         - Not identified     Peritoneal Ascitic Fluid:         - Negative for malignancy (normal / benign)     Lymphovascular Invasion:         - Not identified     LYMPH NODES     Lymph Node Status:         - All lymph nodes negative for tumor cells     Total Number of Pelvic Nodes Examined:         5     Number of Pelvic [place] Nodes Examined:         5     Total Number of Para-aortic Nodes Examined:         0     Number of Para-aortic [place] Nodes Examined:         0     PATHOLOGIC STAGE CLASSIFICATION (PTNM, AJCC 8TH EDITION)     Primary Tumor (pT):         - pT1a     Regional Lymph Nodes (pN):         - pN0     FIGO STAGE     FIGO Stage:         - IA        TEST(S) PERFORMED     MLH1:         - Intact nuclear expression     MSH2:         - Intact nuclear expression     MSH6:         - Intact nuclear expression     PMS2:         - Intact nuclear expression     Immunohistochemistry (IHC) Interpretation for Mismatch Repair (MMR)      Proteins:         - No loss of nuclear expression of MMR proteins: low probability of microsatellite instability-high (MSI-H)       Microsatellite Instability (MSI):         - MSI-Stable (JENNY)         She declined adjuvant therapy at that time.        5/10/2021:  9.  Exam with small vaginal cyst (biopsied):  VAGINA, BIOPSY:   - Vaginal mucosa with a benign retention cyst    - Negative for malignancy    12/13/21:  pending.         Today she comes to clinic feeling well and denies any concerns for her visit. She denies any vaginal bleeding, no changes in her bowel or bladder habits, no nausea/emesis, no lower extremity edema, and no difficulties eating or sleeping. She denies any abdominal discomfort/bloating, no fevers or chills, and no chest pain or shortness of breath. She had knee replacement surgery in October and is recovering well from that. She is current with her annual exam; her mammogram will be due 2/2022, and she had her colon cancer screening 3/2021 (she is due again in 3 years). She is wondering if she can have her next visit closer to home ([place]). She does not monitor her BP at home; she states her PCP did adjust her HTN medications (Lisinopril 40 mg/day; hydrochlorothiazide 25 mg/day).         Review of Systems:    Systemic           no weight changes; no fever; no chills; no night sweats; no appetite changes  Skin           no rashes, or lesions  Eye           no irritation; no changes in vision  Heriberto-Laryngeal           no dysphagia; no hoarseness   Pulmonary    no cough; no shortness of breath  Cardiovascular    no chest pain; no palpitations; + HTN  Gastrointestinal    no diarrhea; no constipation; no abdominal pain; no changes in bowel habits; no blood in stool  Genitourinary   no urinary frequency; no urinary urgency; no dysuria; no pain; no abnormal vaginal discharge; no abnormal vaginal bleeding  Breast    no breast discharge; no breast changes; no breast pain  Musculoskeletal    no myalgias; no arthralgias; no back pain  Psychiatric           no depressed mood; no anxiety    Hematologic              no tender lymph nodes; no noticeable swellings or lumps   Endocrine    no hot flashes; no heat/cold intolerance         Neurological   no tremor; no numbness and tingling; no headaches; no difficulty sleeping      Past Medical History:    No past medical  history on file.      Past Surgical History:    Past Surgical History:   Procedure Laterality Date     CYSTOSCOPY N/A 1/12/2021    Procedure: CYSTOSCOPY  Latex Free;  Surgeon: John Proctor MD;  Location: [facility] OR     LAPAROSCOPIC HYSTERECTOMY TOTAL, BILATERAL SALPINGO-OOPHORECTOMY, NODE DISSECTION, COMBINED N/A 1/12/2021    Procedure: HYSTERECTOMY, TOTAL, LAPAROSCOPIC, WITH SALPINGO-OOPHORECTOMY AND LYMPHADENECTOMY;  Surgeon: John Proctor MD;  Location: [facility] OR         Health Maintenance Due   Topic Date Due     PREVENTIVE CARE VISIT  Never done     ADVANCE CARE PLANNING  Never done     MAMMO SCREENING  Never done     HIV SCREENING  Never done     HEPATITIS C SCREENING  Never done     LIPID  Never done     PHQ-2  Never done     INFLUENZA VACCINE (1) 09/01/2021     COVID-19 Vaccine (3 - Booster for Pfizer series) 09/18/2021       Current Medications:     Current Outpatient Medications   Medication Sig Dispense Refill     amLODIPine (NORVASC) 10 MG tablet Take 10 mg by mouth daily       atorvastatin (LIPITOR) 40 MG tablet Take 40 mg by mouth daily       lisinopril-hydrochlorothiazide (ZESTORETIC) 20-25 MG tablet Take 1 tablet by mouth daily           Allergies:      No Known Allergies     Social History:     Social History     Tobacco Use     Smoking status: Never Smoker     Smokeless tobacco: Never Used   Substance Use Topics     Alcohol use: Yes     Alcohol/week: 7.0 standard drinks     Types: 7 Glasses of wine per week     Comment: 1 glass per day       History   Drug Use Unknown         Family History:     The patient's family history is notable for:    No family history on file.      Physical Exam:     /74   Pulse 88   Temp 98.6  F (37  C) (Oral)   Resp 16   Wt 79.1 kg (174 lb 6.4 oz)   SpO2 99%   BMI 30.90 kg/m    Body mass index is 30.9 kg/m .    General Appearance: healthy and alert, no distress     HEENT: no thyromegaly, no palpable nodules or masses         Cardiovascular: regular rate and rhythm, no gallops, rubs or murmurs     Respiratory: lungs clear, no rales, rhonchi or wheezes, normal diaphragmatic excursion    Musculoskeletal: extremities non tender and without edema    Skin: no lesions or rashes     Neurological: normal gait, no gross defects     Psychiatric: appropriate mood and affect                               Hematological: normal cervical, supraclavicular and inguinal lymph nodes     Gastrointestinal:       abdomen soft, non-tender, non-distended, no organomegaly or masses    Genitourinary: External genitalia and urethral meatus appears normal.  Vagina is smooth without nodularity or masses.  Cervix surgically absent.  Bimanual exam reveal no masses, nodularity or fullness.  Recto-vaginal exam confirms these findings.      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 She completed surgical treatment 1/2021 and declined any adjuvant therapy. She is here today for a surveillance visit.      25 minutes spent on the date of the encounter doing chart review, history and exam, documentation and further activities as noted above      Plan:     1.)       Patient to RTC in 6 months for her next surveillance visit (per her request she would like to have her next visit in 3 months closer to home with an OBGyn). She will continue to have visits every 3 months until 1/2023 and then every 6 months x 3 additional years. Discussed that we will not follow a  with her low stage endometrial cancer diagnosis.  Reviewed recommendations from [facility] not to perform surveillance pap smears in women diagnosed with endometrial cancer as this does not improve detection of local recurrence. Reviewed signs and symptoms for when she should contact the clinic or seek additional care. Patient to contact the clinic with any questions or concerns in the interim.  Answered all of her questions to  the best of my ability.     2.) Genetic risk factors were assessed and she has intact/normal MMR proteins. MMS    3.) Labs and/or tests ordered include: none.      4.) Health maintenance issues addressed today include annual health maintenance and non-gynecologic issues with PCP.    5.)        Her Cancer Treatment Plan and Summary was reviewed, questions answered, and she was given a copy.     LINDSAY Torres, WHNP-BC, ANP-BC  Women's Health Nurse Practitioner  Adult Nurse Practitioner  Division of Gynecologic Oncology    CC  Patient Care Team:  Tiesha Tao MD as PCP - John Norton MD as Assigned Cancer Care Provider

## 2021-12-13 NOTE — NURSING NOTE
Chief Complaint   Patient presents with     Blood Draw     Labs drawn via  by RN. VS taken.     Labs drawn with vpt by rn.  Pt tolerated well.  VS taken.  Pt checked in for next appt.    Galileo Pal RN

## 2022-01-30 ENCOUNTER — HEALTH MAINTENANCE LETTER (OUTPATIENT)
Age: 67
End: 2022-01-30

## 2022-07-13 ENCOUNTER — ONCOLOGY VISIT (OUTPATIENT)
Dept: ONCOLOGY | Facility: CLINIC | Age: 67
End: 2022-07-13
Attending: NURSE PRACTITIONER
Payer: COMMERCIAL

## 2022-07-13 VITALS
OXYGEN SATURATION: 100 % | DIASTOLIC BLOOD PRESSURE: 72 MMHG | SYSTOLIC BLOOD PRESSURE: 106 MMHG | TEMPERATURE: 98.6 F | WEIGHT: 172.6 LBS | BODY MASS INDEX: 30.58 KG/M2 | RESPIRATION RATE: 18 BRPM | HEART RATE: 79 BPM

## 2022-07-13 DIAGNOSIS — C54.1 ENDOMETRIAL CANCER (H): Primary | ICD-10-CM

## 2022-07-13 PROCEDURE — 99213 OFFICE O/P EST LOW 20 MIN: CPT | Performed by: NURSE PRACTITIONER

## 2022-07-13 PROCEDURE — G0463 HOSPITAL OUTPT CLINIC VISIT: HCPCS

## 2022-07-13 ASSESSMENT — PAIN SCALES - GENERAL: PAINLEVEL: NO PAIN (0)

## 2022-07-13 NOTE — PROGRESS NOTES
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 She completed surgical treatment 2021 and declined any adjuvant therapy. She is here today for a surveillance visit.       Oncology history:  She was evaluated by Dr. Morton for PMB.  This evaluation resulted in an US which demonstrated fibroids and a thickened EMS.  A pap and biopsy were obtained (HPV negative, JULIET cells).  Biopsy demonstarted grade 1 EMCA     21: Total laparoscopic hysterectomy, bilateral salpingo-oophorectomy, bilateral sentinel lymph node dissection, cystoscopy     PATH:  FINAL DIAGNOSIS:   A. SENTINEL LYMPH NODE, RIGHT PELVIC #1, EXCISION:   - One reactive lymph node, negative for malignancy (0/1)     B. SENTINEL LYMPH NODE, RIGHT PELVIC #2, EXCISION:   - Two reactive lymph nodes, negative for malignancy (0/2)     C. SENTINEL LYMPH NODE, LEFT PELVIC 1 AND 2, LYMPHADENECTOMY:   - Two reactive lymph nodes, negative for malignancy (0/2)     D. UTERUS, BILATERAL FALLOPIAN TUBES AND OVARIES, TOTAL LAPAROSCOPIC HYSTERECTOMY WITH BILATERAL SALPINGO-OOPHORECTOMY:   - Endometrial adenocarcinoma, mixed cell type, endometrioid, FIGO grade 2 and serous carcinoma   - Leiomyomas with degenerative changes and focal calcification   - Cervix with squamous metaplasia and Nabothian cysts   - Left ovary with cortical inclusion cysts   - Bilateral ovaries with atrophic changes   - Bilateral fallopian tubes with no significant histologic abnormalities     Synoptic Report:       Histologic Type:         - Mixed cell carcinoma (types and percentages) - Endometrioid (FIGO grade 2, approximately 60%) and serous (approximately 40%)     Tumor Size: 4.0 x 3.5 x 0.6 Centimeters (cm)     Myometrial Invasion:         - Present       Depth of Myometrial Invasion (Millimeters): 1 mm        Myometrial Thickness (Millimeters): 21 mm       Percentage of Myometrial Invasion: 5%     Adenomyosis:         - Not identified     Uterine Serosa Involvement:         - Not identified     Lower Uterine Segment Involvement:         - Not identified     Cervical Stromal Involvement:         - Not identified     Other Tissue / Organ Involvement:         - Not identified     Peritoneal Ascitic Fluid:         - Negative for malignancy (normal / benign)     Lymphovascular Invasion:         - Not identified     LYMPH NODES     Lymph Node Status:         - All lymph nodes negative for tumor cells     Total Number of Pelvic Nodes Examined:         5     Number of Pelvic [place] Nodes Examined:         5     Total Number of Para-aortic Nodes Examined:         0     Number of Para-aortic [place] Nodes Examined:         0     PATHOLOGIC STAGE CLASSIFICATION (PTNM, AJCC 8TH EDITION)     Primary Tumor (pT):         - pT1a     Regional Lymph Nodes (pN):         - pN0     FIGO STAGE     FIGO Stage:         - IA        TEST(S) PERFORMED     MLH1:         - Intact nuclear expression     MSH2:         - Intact nuclear expression     MSH6:         - Intact nuclear expression     PMS2:         - Intact nuclear expression     Immunohistochemistry (IHC) Interpretation for Mismatch Repair (MMR)      Proteins:         - No loss of nuclear expression of MMR proteins: low probability of microsatellite instability-high (MSI-H)       Microsatellite Instability (MSI):         - MSI-Stable (JENNY)         She declined adjuvant therapy at that time.        5/10/2021:  9.  Exam with small vaginal cyst (biopsied):  VAGINA, BIOPSY:   - Vaginal mucosa with a benign retention cyst   - Negative for malignancy     12/13/21:  6.          Today she comes to clinic feeling well and is without concerns. She denies any vaginal bleeding, no changes in her bowel or bladder habits, no nausea/emesis, no lower extremity edema, and no  difficulties eating or sleeping. She denies any abdominal discomfort/bloating, no fevers or chills, and no chest pain or shortness of breath. She saw her PCP 11/21; her mammogram was last done 2/21; her colonoscopy is due 3/24; and she had a DEXA 1/20 and is not sure when she is due again. She was taking Ca and vitamin D for a while for her osteopenia, but ran out and never got any more. She is not checking her BP at home.          Review of Systems:    Systemic           no weight changes; no fever; no chills; no night sweats; no appetite changes  Skin           no rashes, or lesions  Eye           no irritation; no changes in vision  Heriberto-Laryngeal           no dysphagia; no hoarseness   Pulmonary    no cough; no shortness of breath  Cardiovascular    no chest pain; no palpitations; + HTN  Gastrointestinal    no diarrhea; no constipation; no abdominal pain; no changes in bowel habits; no blood in stool  Genitourinary   no urinary frequency; no urinary urgency; no dysuria; no pain; no abnormal vaginal discharge; no abnormal vaginal bleeding  Breast    no breast discharge; no breast changes; no breast pain  Musculoskeletal    no myalgias; no arthralgias; no back pain  Psychiatric           no depressed mood; no anxiety    Hematologic              no tender lymph nodes; no noticeable swellings or lumps   Endocrine    no hot flashes; no heat/cold intolerance         Neurological   no tremor; no numbness and tingling; no headaches; no difficulty sleeping      Past Medical History:    No past medical history on file.      Past Surgical History:    Past Surgical History:   Procedure Laterality Date     CYSTOSCOPY N/A 1/12/2021    Procedure: CYSTOSCOPY  Latex Free;  Surgeon: John Proctor MD;  Location: [facility] OR     LAPAROSCOPIC HYSTERECTOMY TOTAL, BILATERAL SALPINGO-OOPHORECTOMY, NODE DISSECTION, COMBINED N/A 1/12/2021    Procedure: HYSTERECTOMY, TOTAL, LAPAROSCOPIC, WITH SALPINGO-OOPHORECTOMY AND  LYMPHADENECTOMY;  Surgeon: John Proctor MD;  Location:  OR         Health Maintenance Due   Topic Date Due     DEXA  Never done     ADVANCE CARE PLANNING  Never done     MAMMO SCREENING  Never done     HIV SCREENING  Never done     HEPATITIS C SCREENING  Never done     LIPID  Never done     PHQ-2 (once per calendar year)  Never done     DTAP/TDAP/TD IMMUNIZATION (3 - Td or Tdap) 04/06/2022     COVID-19 Vaccine (4 - Booster for Pfizer series) 04/30/2022     MEDICARE ANNUAL WELLNESS VISIT  Never done     FALL RISK ASSESSMENT  Never done       Current Medications:     Current Outpatient Medications   Medication Sig Dispense Refill     amLODIPine (NORVASC) 10 MG tablet Take 10 mg by mouth daily       lisinopril-hydrochlorothiazide (ZESTORETIC) 20-25 MG tablet Take 1 tablet by mouth daily       atorvastatin (LIPITOR) 40 MG tablet Take 40 mg by mouth daily           Allergies:      No Known Allergies     Social History:     Social History     Tobacco Use     Smoking status: Never Smoker     Smokeless tobacco: Never Used   Substance Use Topics     Alcohol use: Yes     Alcohol/week: 7.0 standard drinks     Types: 7 Glasses of wine per week     Comment: 1 glass per day       History   Drug Use Unknown         Family History:     The patient's family history is notable for:    No family history on file.      Physical Exam:     /72   Pulse 79   Temp 98.6  F (37  C) (Oral)   Resp 18   Wt 78.3 kg (172 lb 9.6 oz)   SpO2 100%   BMI 30.58 kg/m    Body mass index is 30.58 kg/m .    General Appearance: healthy and alert, no distress     HEENT: no thyromegaly, no palpable nodules or masses        Cardiovascular: regular rate and rhythm, no gallops, rubs or murmurs     Respiratory: lungs clear, no rales, rhonchi or wheezes, normal diaphragmatic excursion    Musculoskeletal: extremities non tender and without edema    Skin: no lesions or rashes     Neurological: normal gait, no gross  defects     Psychiatric: appropriate mood and affect                               Hematological: normal cervical, supraclavicular and inguinal lymph nodes     Gastrointestinal:       abdomen soft, non-tender, non-distended, no organomegaly or masses    Genitourinary: External genitalia and urethral meatus appears normal.  Vagina is smooth without nodularity or masses.  Cervix surgically absent. Bimanual exam reveal no masses, nodularity or fullness.  Recto-vaginal exam confirms these findings.      Assessment:    Dee Carlos is a 67 year old woman with a history of stage IA grade 2 endometrioid endometrial adenocarcinoma. She completed surgical treatment 1/2021 and declined any adjuvant therapy. She is here today for a surveillance visit.      22 minutes spent on the date of the encounter doing chart review, history and exam, documentation and further activities as noted above      Plan:     1.)       Patient to RTC in 6 months for her next surveillance visit and plan for her to see her OBGyn in 3 months for a visit. Reviewed recommendations from [facility] not to perform surveillance pap smears in women diagnosed with endometrial cancer as this does not improve detection of local recurrence. Reviewed signs and symptoms for when she should contact the clinic or seek additional care. Patient to contact the clinic with any questions or concerns in the interim.  Answered all of her questions to the best of my ability.     2.) Genetic risk factors were assessed and she has intact/normal MMR proteins. MMS     3.) Labs and/or tests ordered include:  None.      4.) Health maintenance issues addressed today include annual health maintenance and non-gynecologic issues with PCP.    LINDSAY Torres, WHNP-BC, ANP-BC  Women's Health Nurse Practitioner  Adult Nurse Practitioner  Division of Gynecologic Oncology          CC  Patient Care Team:  Tiesha Tao MD as PCP - General  John Proctor MD as Assigned Cancer  Care Provider  SELF, REFERRED

## 2022-07-13 NOTE — NURSING NOTE
"Oncology Rooming Note    July 13, 2022 12:41 PM   Dee Carlos is a 67 year old female who presents for:    Chief Complaint   Patient presents with     Oncology Clinic Visit     Pt is here for a rtn for Endometrial Cancer     Initial Vitals: Blood Pressure 106/72   Pulse 79   Temperature 98.6  F (37  C) (Oral)   Respiration 18   Weight 78.3 kg (172 lb 9.6 oz)   Oxygen Saturation 100%   Body Mass Index 30.58 kg/m   Estimated body mass index is 30.58 kg/m  as calculated from the following:    Height as of 8/23/21: 1.6 m (5' 2.99\").    Weight as of this encounter: 78.3 kg (172 lb 9.6 oz). Body surface area is 1.87 meters squared.  No Pain (0) Comment: Data Unavailable   No LMP recorded. Patient is postmenopausal.  Allergies reviewed: Yes  Medications reviewed: Yes    Medications: Medication refills not needed today.  Pharmacy name entered into [facility]: [facility], [place] - [id number]    Clinical concerns: none       Kimberly Sellers MA            "

## 2022-07-13 NOTE — LETTER
2022         RE: Dee Carlos  [address]  [place] [address]        Dear Colleague,    Thank you for referring your patient, Dee Carlos, to the [facility]. Please see a copy of my visit note below.                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 She completed surgical treatment 2021 and declined any adjuvant therapy. She is here today for a surveillance visit.       Oncology history:  She was evaluated by Dr. Morton for PMB.  This evaluation resulted in an US which demonstrated fibroids and a thickened EMS.  A pap and biopsy were obtained (HPV negative, JULIET cells).  Biopsy demonstarted grade 1 EMCA     21: Total laparoscopic hysterectomy, bilateral salpingo-oophorectomy, bilateral sentinel lymph node dissection, cystoscopy     PATH:  FINAL DIAGNOSIS:   A. SENTINEL LYMPH NODE, RIGHT PELVIC #1, EXCISION:   - One reactive lymph node, negative for malignancy (0/1)     B. SENTINEL LYMPH NODE, RIGHT PELVIC #2, EXCISION:   - Two reactive lymph nodes, negative for malignancy (0/2)     C. SENTINEL LYMPH NODE, LEFT PELVIC 1 AND 2, LYMPHADENECTOMY:   - Two reactive lymph nodes, negative for malignancy (0/2)     D. UTERUS, BILATERAL FALLOPIAN TUBES AND OVARIES, TOTAL LAPAROSCOPIC HYSTERECTOMY WITH BILATERAL SALPINGO-OOPHORECTOMY:   - Endometrial adenocarcinoma, mixed cell type, endometrioid, FIGO grade 2 and serous carcinoma   - Leiomyomas with degenerative changes and focal calcification   - Cervix with squamous metaplasia and Nabothian cysts   - Left ovary with cortical inclusion cysts   - Bilateral ovaries with atrophic changes   - Bilateral fallopian tubes with no significant histologic abnormalities     Synoptic Report:       Histologic Type:         - Mixed cell  carcinoma (types and percentages) - Endometrioid (FIGO grade 2, approximately 60%) and serous (approximately 40%)     Tumor Size: 4.0 x 3.5 x 0.6 Centimeters (cm)     Myometrial Invasion:         - Present       Depth of Myometrial Invasion (Millimeters): 1 mm       Myometrial Thickness (Millimeters): 21 mm       Percentage of Myometrial Invasion: 5%     Adenomyosis:         - Not identified     Uterine Serosa Involvement:         - Not identified     Lower Uterine Segment Involvement:         - Not identified     Cervical Stromal Involvement:         - Not identified     Other Tissue / Organ Involvement:         - Not identified     Peritoneal Ascitic Fluid:         - Negative for malignancy (normal / benign)     Lymphovascular Invasion:         - Not identified     LYMPH NODES     Lymph Node Status:         - All lymph nodes negative for tumor cells     Total Number of Pelvic Nodes Examined:         5     Number of Pelvic [place] Nodes Examined:         5     Total Number of Para-aortic Nodes Examined:         0     Number of Para-aortic [place] Nodes Examined:         0     PATHOLOGIC STAGE CLASSIFICATION (PTNM, AJCC 8TH EDITION)     Primary Tumor (pT):         - pT1a     Regional Lymph Nodes (pN):         - pN0     FIGO STAGE     FIGO Stage:         - IA        TEST(S) PERFORMED     MLH1:         - Intact nuclear expression     MSH2:         - Intact nuclear expression     MSH6:         - Intact nuclear expression     PMS2:         - Intact nuclear expression     Immunohistochemistry (IHC) Interpretation for Mismatch Repair (MMR)      Proteins:         - No loss of nuclear expression of MMR proteins: low probability of microsatellite instability-high (MSI-H)       Microsatellite Instability (MSI):         - MSI-Stable (JENNY)         She declined adjuvant therapy at that time.        5/10/2021:  9.  Exam with small vaginal cyst (biopsied):  VAGINA, BIOPSY:   - Vaginal mucosa with a benign retention cyst    - Negative for malignancy     12/13/21:  6.          Today she comes to clinic feeling well and is without concerns. She denies any vaginal bleeding, no changes in her bowel or bladder habits, no nausea/emesis, no lower extremity edema, and no difficulties eating or sleeping. She denies any abdominal discomfort/bloating, no fevers or chills, and no chest pain or shortness of breath. She saw her PCP 11/21; her mammogram was last done 2/21; her colonoscopy is due 3/24; and she had a DEXA 1/20 and is not sure when she is due again. She was taking Ca and vitamin D for a while for her osteopenia, but ran out and never got any more. She is not checking her BP at home.          Review of Systems:    Systemic           no weight changes; no fever; no chills; no night sweats; no appetite changes  Skin           no rashes, or lesions  Eye           no irritation; no changes in vision  Heriberto-Laryngeal           no dysphagia; no hoarseness   Pulmonary    no cough; no shortness of breath  Cardiovascular    no chest pain; no palpitations; + HTN  Gastrointestinal    no diarrhea; no constipation; no abdominal pain; no changes in bowel habits; no blood in stool  Genitourinary   no urinary frequency; no urinary urgency; no dysuria; no pain; no abnormal vaginal discharge; no abnormal vaginal bleeding  Breast    no breast discharge; no breast changes; no breast pain  Musculoskeletal    no myalgias; no arthralgias; no back pain  Psychiatric           no depressed mood; no anxiety    Hematologic              no tender lymph nodes; no noticeable swellings or lumps   Endocrine    no hot flashes; no heat/cold intolerance         Neurological   no tremor; no numbness and tingling; no headaches; no difficulty sleeping      Past Medical History:    No past medical history on file.      Past Surgical History:    Past Surgical History:   Procedure Laterality Date     CYSTOSCOPY N/A 1/12/2021    Procedure: CYSTOSCOPY  Latex Free;  Surgeon:  John Proctor MD;  Location: [facility] OR     LAPAROSCOPIC HYSTERECTOMY TOTAL, BILATERAL SALPINGO-OOPHORECTOMY, NODE DISSECTION, COMBINED N/A 1/12/2021    Procedure: HYSTERECTOMY, TOTAL, LAPAROSCOPIC, WITH SALPINGO-OOPHORECTOMY AND LYMPHADENECTOMY;  Surgeon: John Proctor MD;  Location: [facility] OR         Health Maintenance Due   Topic Date Due     DEXA  Never done     ADVANCE CARE PLANNING  Never done     MAMMO SCREENING  Never done     HIV SCREENING  Never done     HEPATITIS C SCREENING  Never done     LIPID  Never done     PHQ-2 (once per calendar year)  Never done     DTAP/TDAP/TD IMMUNIZATION (3 - Td or Tdap) 04/06/2022     COVID-19 Vaccine (4 - Booster for Pfizer series) 04/30/2022     MEDICARE ANNUAL WELLNESS VISIT  Never done     FALL RISK ASSESSMENT  Never done       Current Medications:     Current Outpatient Medications   Medication Sig Dispense Refill     amLODIPine (NORVASC) 10 MG tablet Take 10 mg by mouth daily       lisinopril-hydrochlorothiazide (ZESTORETIC) 20-25 MG tablet Take 1 tablet by mouth daily       atorvastatin (LIPITOR) 40 MG tablet Take 40 mg by mouth daily           Allergies:      No Known Allergies     Social History:     Social History     Tobacco Use     Smoking status: Never Smoker     Smokeless tobacco: Never Used   Substance Use Topics     Alcohol use: Yes     Alcohol/week: 7.0 standard drinks     Types: 7 Glasses of wine per week     Comment: 1 glass per day       History   Drug Use Unknown         Family History:     The patient's family history is notable for:    No family history on file.      Physical Exam:     /72   Pulse 79   Temp 98.6  F (37  C) (Oral)   Resp 18   Wt 78.3 kg (172 lb 9.6 oz)   SpO2 100%   BMI 30.58 kg/m    Body mass index is 30.58 kg/m .    General Appearance: healthy and alert, no distress     HEENT: no thyromegaly, no palpable nodules or masses        Cardiovascular: regular rate and rhythm, no gallops, rubs or  murmurs     Respiratory: lungs clear, no rales, rhonchi or wheezes, normal diaphragmatic excursion    Musculoskeletal: extremities non tender and without edema    Skin: no lesions or rashes     Neurological: normal gait, no gross defects     Psychiatric: appropriate mood and affect                               Hematological: normal cervical, supraclavicular and inguinal lymph nodes     Gastrointestinal:       abdomen soft, non-tender, non-distended, no organomegaly or masses    Genitourinary: External genitalia and urethral meatus appears normal.  Vagina is smooth without nodularity or masses.  Cervix surgically absent. Bimanual exam reveal no masses, nodularity or fullness.  Recto-vaginal exam confirms these findings.      Assessment:    Dee Carlos is a 67 year old woman with a history of stage IA grade 2 endometrioid endometrial adenocarcinoma. She completed surgical treatment 1/2021 and declined any adjuvant therapy. She is here today for a surveillance visit.      22 minutes spent on the date of the encounter doing chart review, history and exam, documentation and further activities as noted above      Plan:     1.)       Patient to RTC in 6 months for her next surveillance visit and plan for her to see her OBGyn in 3 months for a visit. Reviewed recommendations from [facility] not to perform surveillance pap smears in women diagnosed with endometrial cancer as this does not improve detection of local recurrence. Reviewed signs and symptoms for when she should contact the clinic or seek additional care. Patient to contact the clinic with any questions or concerns in the interim.  Answered all of her questions to the best of my ability.     2.) Genetic risk factors were assessed and she has intact/normal MMR proteins. MMS     3.) Labs and/or tests ordered include:  None.      4.) Health maintenance issues addressed today include annual health maintenance and non-gynecologic issues with PCP.    LINDSAY Torres,  WHNP-BC, ANP-BC  Women's [facility] Nurse Practitioner  Adult Nurse Practitioner  Division of Gynecologic Oncology    CC  Patient Care Team:  Tiesha Tao MD as PCP - John Norton MD as Assigned Cancer Care Provider

## 2022-07-17 ENCOUNTER — HEALTH MAINTENANCE LETTER (OUTPATIENT)
Age: 67
End: 2022-07-17

## 2022-09-24 ENCOUNTER — HEALTH MAINTENANCE LETTER (OUTPATIENT)
Age: 67
End: 2022-09-24

## 2023-01-30 ENCOUNTER — HEALTH MAINTENANCE LETTER (OUTPATIENT)
Age: 68
End: 2023-01-30

## 2023-08-05 ENCOUNTER — HEALTH MAINTENANCE LETTER (OUTPATIENT)
Age: 68
End: 2023-08-05

## 2023-12-23 ENCOUNTER — HEALTH MAINTENANCE LETTER (OUTPATIENT)
Age: 68
End: 2023-12-23

## 2024-09-22 ENCOUNTER — HEALTH MAINTENANCE LETTER (OUTPATIENT)
Age: 69
End: 2024-09-22

## 2025-04-25 NOTE — OR NURSING
No protocol for requested medication.    Medication: pantoprazole and tramadol   Last office visit date: 2/19/25    Pharmacy:  [facility]    Order pended, routed to clinician for review.      Pt. Voided. Gyn/Onc resident will come see patient prior to discharge. Pain improving after PO medications. Eager to discharge home.

## (undated) DEVICE — JELLY LUBRICATING SURGILUBE 2OZ TUBE

## (undated) DEVICE — SUCTION IRR STRYKERFLOW II W/TIP 250-070-520

## (undated) DEVICE — ENDO TROCAR FIRST ENTRY KII FIOS Z-THRD 05X100MM CTF03

## (undated) DEVICE — DEVICE SUTURE GRASPER TROCAR CLOSURE 14GA PMITCSG

## (undated) DEVICE — UTERINE MANIPULATOR RUMI 6.7MMX10CM UMG670

## (undated) DEVICE — LINEN TOWEL PACK X30 5481

## (undated) DEVICE — SU VICRYL 2-0 CT-2 27" J333H

## (undated) DEVICE — DRSG PRIMAPORE 02X3" 7133

## (undated) DEVICE — LINEN TOWEL PACK X6 WHITE 5487

## (undated) DEVICE — KOH COLPOTOMIZER OCCLUDER  CPO-6

## (undated) DEVICE — ENDO SCOPE WARMER SEAL  C3101

## (undated) DEVICE — PREP CHLORAPREP 26ML TINTED ORANGE  260815

## (undated) DEVICE — KIT PATIENT POSITIONING PIGAZZI LATEX FREE 40580

## (undated) DEVICE — ESU LIGASURE LAPAROSCOPIC BLUNT TIP SEALER 5MMX37CM LF1837

## (undated) DEVICE — SU ENDO STITCH POLYSORB 2-0 ES-9 48"  170053

## (undated) DEVICE — SUCTION MANIFOLD DORNOCH ULTRA CART UL-CL500

## (undated) DEVICE — ESU GROUND PAD ADULT W/CORD E7507

## (undated) DEVICE — DEVICE ENDO STITCH APPLIER 10MM 173016

## (undated) DEVICE — DRAPE SHEET MED 44X70" 9355

## (undated) DEVICE — GLOVE GAMMEX NEOPRENE ULTRA SZ 7.5 LF 8515

## (undated) DEVICE — ENDO TROCAR SLEEVE KII Z-THREADED 05X100MM CTS02

## (undated) DEVICE — TUBING SMOKE EVAC PLUME PORT PP010CS

## (undated) DEVICE — SPECIMEN TRAP MUCOUS 40ML LUKI C30200A

## (undated) DEVICE — NDL SPINAL 22GA 3.5" QUINCKE 405181

## (undated) DEVICE — DEVICE SUTURE PASSER 14GA WECK EFX EFXSP2

## (undated) DEVICE — WIPES FOLEY CARE SURESTEP PROVON DFC100

## (undated) DEVICE — UTERINE MANIPULATOR RUMI 6.7MMX8CM UMB678

## (undated) DEVICE — TUBING IRRIG CYSTO/BLADDER SET 81" LF 2C4040

## (undated) DEVICE — SU MONOCRYL 4-0 PS-2 27" UND Y426H

## (undated) DEVICE — ENDO TROCAR SLEEVE KII Z-THREADED 12X100MM CTS22

## (undated) DEVICE — NDL INSUFFLATION 13GA 120MM C2201

## (undated) DEVICE — SOL NACL 0.9% INJ 1000ML BAG 2B1324X

## (undated) DEVICE — SOL WATER IRRIG 1000ML BOTTLE 2F7114

## (undated) DEVICE — SYR 30ML SLIP TIP W/O NDL 302833

## (undated) DEVICE — SU VICRYL 0 UR-6 27" J603H

## (undated) DEVICE — ESU ENDO SCISSORS 5MM CVD 5DCS

## (undated) DEVICE — SU VICRYL 0 TIE 54" J608H

## (undated) DEVICE — DRSG STERI STRIP 1/2X4" R1547

## (undated) DEVICE — Device

## (undated) DEVICE — PREP POVIDONE IODINE SOLUTION 10% 4OZ

## (undated) DEVICE — SOL NACL 0.9% IRRIG 1000ML BOTTLE 2F7124

## (undated) RX ORDER — ALBUMIN, HUMAN INJ 5% 5 %
SOLUTION INTRAVENOUS
Status: DISPENSED
Start: 2021-01-12

## (undated) RX ORDER — FENTANYL CITRATE 50 UG/ML
INJECTION, SOLUTION INTRAMUSCULAR; INTRAVENOUS
Status: DISPENSED
Start: 2021-01-12

## (undated) RX ORDER — FENTANYL CITRATE-0.9 % NACL/PF 10 MCG/ML
PLASTIC BAG, INJECTION (ML) INTRAVENOUS
Status: DISPENSED
Start: 2021-01-12

## (undated) RX ORDER — ONDANSETRON 2 MG/ML
INJECTION INTRAMUSCULAR; INTRAVENOUS
Status: DISPENSED
Start: 2021-01-12

## (undated) RX ORDER — EPHEDRINE SULFATE 50 MG/ML
INJECTION, SOLUTION INTRAMUSCULAR; INTRAVENOUS; SUBCUTANEOUS
Status: DISPENSED
Start: 2021-01-12

## (undated) RX ORDER — CEFAZOLIN SODIUM 2 G/100ML
INJECTION, SOLUTION INTRAVENOUS
Status: DISPENSED
Start: 2021-01-12

## (undated) RX ORDER — DEXAMETHASONE SODIUM PHOSPHATE 4 MG/ML
INJECTION, SOLUTION INTRA-ARTICULAR; INTRALESIONAL; INTRAMUSCULAR; INTRAVENOUS; SOFT TISSUE
Status: DISPENSED
Start: 2022-07-14

## (undated) RX ORDER — LIDOCAINE HYDROCHLORIDE 20 MG/ML
INJECTION, SOLUTION EPIDURAL; INFILTRATION; INTRACAUDAL; PERINEURAL
Status: DISPENSED
Start: 2021-01-12

## (undated) RX ORDER — ONDANSETRON 2 MG/ML
INJECTION INTRAMUSCULAR; INTRAVENOUS
Status: DISPENSED
Start: 2022-07-14

## (undated) RX ORDER — BUPIVACAINE HYDROCHLORIDE 2.5 MG/ML
INJECTION, SOLUTION EPIDURAL; INFILTRATION; INTRACAUDAL
Status: DISPENSED
Start: 2021-01-12

## (undated) RX ORDER — LABETALOL HYDROCHLORIDE 5 MG/ML
INJECTION, SOLUTION INTRAVENOUS
Status: DISPENSED
Start: 2021-01-12

## (undated) RX ORDER — CALCIUM CHLORIDE 100 MG/ML
INJECTION INTRAVENOUS; INTRAVENTRICULAR
Status: DISPENSED
Start: 2021-01-12

## (undated) RX ORDER — LIDOCAINE HYDROCHLORIDE 20 MG/ML
INJECTION, SOLUTION EPIDURAL; INFILTRATION; INTRACAUDAL; PERINEURAL
Status: DISPENSED
Start: 2022-07-14

## (undated) RX ORDER — ESMOLOL HYDROCHLORIDE 10 MG/ML
INJECTION INTRAVENOUS
Status: DISPENSED
Start: 2021-01-12

## (undated) RX ORDER — PROPOFOL 10 MG/ML
INJECTION, EMULSION INTRAVENOUS
Status: DISPENSED
Start: 2022-07-14

## (undated) RX ORDER — PHENAZOPYRIDINE HYDROCHLORIDE 200 MG/1
TABLET, FILM COATED ORAL
Status: DISPENSED
Start: 2021-01-12

## (undated) RX ORDER — PROPOFOL 10 MG/ML
INJECTION, EMULSION INTRAVENOUS
Status: DISPENSED
Start: 2021-01-12

## (undated) RX ORDER — ACETAMINOPHEN 325 MG/1
TABLET ORAL
Status: DISPENSED
Start: 2021-01-12

## (undated) RX ORDER — GLYCOPYRROLATE 0.2 MG/ML
INJECTION, SOLUTION INTRAMUSCULAR; INTRAVENOUS
Status: DISPENSED
Start: 2021-01-12